# Patient Record
Sex: FEMALE | Race: WHITE | NOT HISPANIC OR LATINO | Employment: FULL TIME | URBAN - METROPOLITAN AREA
[De-identification: names, ages, dates, MRNs, and addresses within clinical notes are randomized per-mention and may not be internally consistent; named-entity substitution may affect disease eponyms.]

---

## 2018-11-22 ENCOUNTER — HOSPITAL ENCOUNTER (EMERGENCY)
Facility: HOSPITAL | Age: 58
Discharge: HOME | End: 2018-11-22
Attending: EMERGENCY MEDICINE
Payer: COMMERCIAL

## 2018-11-22 VITALS
OXYGEN SATURATION: 95 % | RESPIRATION RATE: 16 BRPM | BODY MASS INDEX: 24.71 KG/M2 | SYSTOLIC BLOOD PRESSURE: 120 MMHG | HEART RATE: 79 BPM | DIASTOLIC BLOOD PRESSURE: 60 MMHG | HEIGHT: 68 IN | WEIGHT: 163 LBS | TEMPERATURE: 98 F

## 2018-11-22 DIAGNOSIS — R07.81 RIB PAIN ON RIGHT SIDE: ICD-10-CM

## 2018-11-22 DIAGNOSIS — S22.31XA CLOSED FRACTURE OF ONE RIB OF RIGHT SIDE, INITIAL ENCOUNTER: Primary | ICD-10-CM

## 2018-11-22 DIAGNOSIS — R55 SYNCOPE, UNSPECIFIED SYNCOPE TYPE: ICD-10-CM

## 2018-11-22 DIAGNOSIS — R55 SYNCOPE: ICD-10-CM

## 2018-11-22 LAB
ALBUMIN SERPL BCP-MCNC: 4.1 G/DL
ALP SERPL-CCNC: 102 U/L
ALT SERPL W/O P-5'-P-CCNC: 53 U/L
ANION GAP SERPL CALC-SCNC: 12 MMOL/L
AST SERPL-CCNC: 53 U/L
BASOPHILS # BLD AUTO: 0 K/UL
BASOPHILS NFR BLD: 0.3 %
BILIRUB SERPL-MCNC: 0.3 MG/DL
BUN SERPL-MCNC: 21 MG/DL
CALCIUM SERPL-MCNC: 9.4 MG/DL
CHLORIDE SERPL-SCNC: 100 MMOL/L
CO2 SERPL-SCNC: 27 MMOL/L
CREAT SERPL-MCNC: 0.9 MG/DL
DIFFERENTIAL METHOD: ABNORMAL
EOSINOPHIL # BLD AUTO: 0 K/UL
EOSINOPHIL NFR BLD: 0.3 %
ERYTHROCYTE [DISTWIDTH] IN BLOOD BY AUTOMATED COUNT: 13.7 %
EST. GFR  (AFRICAN AMERICAN): >60 ML/MIN/1.73 M^2
EST. GFR  (NON AFRICAN AMERICAN): >60 ML/MIN/1.73 M^2
GLUCOSE SERPL-MCNC: 137 MG/DL
HCT VFR BLD AUTO: 39.7 %
HGB BLD-MCNC: 13.4 G/DL
LYMPHOCYTES # BLD AUTO: 1.1 K/UL
LYMPHOCYTES NFR BLD: 12.9 %
MAGNESIUM SERPL-MCNC: 2.4 MG/DL
MCH RBC QN AUTO: 28.8 PG
MCHC RBC AUTO-ENTMCNC: 33.7 G/DL
MCV RBC AUTO: 86 FL
MONOCYTES # BLD AUTO: 0.4 K/UL
MONOCYTES NFR BLD: 5.1 %
NEUTROPHILS # BLD AUTO: 7.1 K/UL
NEUTROPHILS NFR BLD: 81.4 %
PLATELET # BLD AUTO: 322 K/UL
PMV BLD AUTO: 7.6 FL
POTASSIUM SERPL-SCNC: 3.2 MMOL/L
PROT SERPL-MCNC: 7.4 G/DL
RBC # BLD AUTO: 4.63 M/UL
SODIUM SERPL-SCNC: 139 MMOL/L
WBC # BLD AUTO: 8.7 K/UL

## 2018-11-22 PROCEDURE — 80053 COMPREHEN METABOLIC PANEL: CPT

## 2018-11-22 PROCEDURE — 85025 COMPLETE CBC W/AUTO DIFF WBC: CPT

## 2018-11-22 PROCEDURE — 25000003 PHARM REV CODE 250: Performed by: EMERGENCY MEDICINE

## 2018-11-22 PROCEDURE — 94799 UNLISTED PULMONARY SVC/PX: CPT

## 2018-11-22 PROCEDURE — 93005 ELECTROCARDIOGRAM TRACING: CPT

## 2018-11-22 PROCEDURE — 99284 EMERGENCY DEPT VISIT MOD MDM: CPT | Mod: 25

## 2018-11-22 PROCEDURE — 83735 ASSAY OF MAGNESIUM: CPT

## 2018-11-22 PROCEDURE — 36415 COLL VENOUS BLD VENIPUNCTURE: CPT

## 2018-11-22 RX ORDER — TRAZODONE HYDROCHLORIDE 50 MG/1
50 TABLET ORAL NIGHTLY
COMMUNITY
End: 2020-12-07

## 2018-11-22 RX ORDER — HYDROCODONE BITARTRATE AND ACETAMINOPHEN 5; 325 MG/1; MG/1
1 TABLET ORAL EVERY 6 HOURS PRN
Qty: 15 TABLET | Refills: 0 | Status: SHIPPED | OUTPATIENT
Start: 2018-11-22 | End: 2020-12-07

## 2018-11-22 RX ORDER — CITALOPRAM 10 MG/1
10 TABLET ORAL DAILY
COMMUNITY
End: 2021-05-24

## 2018-11-22 RX ORDER — HYDROCODONE BITARTRATE AND ACETAMINOPHEN 5; 325 MG/1; MG/1
1 TABLET ORAL
Status: COMPLETED | OUTPATIENT
Start: 2018-11-22 | End: 2018-11-22

## 2018-11-22 RX ADMIN — HYDROCODONE BITARTRATE AND ACETAMINOPHEN 1 TABLET: 5; 325 TABLET ORAL at 04:11

## 2018-11-22 NOTE — ED PROVIDER NOTES
"Encounter Date: 11/22/2018    SCRIBE #1 NOTE: I, Mai Ferreira, am scribing for, and in the presence of, Dr. Otis Jacome.       History     Chief Complaint   Patient presents with    Loss of Consciousness     " passed out and fell "     Fall     rt. rib pain        Time seen by provider: 2:13 PM on 11/22/2018    Leticia Zelaya is a 58 y.o. female who presents to the ED with an onset of of right-sided rib pain that resulted after becoming dizzy and passing out one day ago. When she passed out, she landed on the right side of her body. After passing out, she had another episode of dizziness. Although, she no longer feels dizzy. The pt explains that this happens "periodically". After previous episodes, she had blood work done at other EDs, which revealed that she had low potassium both times. The patient denies any other symptoms at this time. No pertinent SHx noted. Pt is allergic to Percocet.       The history is provided by the patient.     Review of patient's allergies indicates:   Allergen Reactions    Percocet [oxycodone-acetaminophen] Itching     History reviewed. No pertinent past medical history.  History reviewed. No pertinent surgical history.  Family History   Problem Relation Age of Onset    Hypertension Father     Heart disease Neg Hx     Heart attack Neg Hx      Social History     Tobacco Use    Smoking status: Never Smoker   Substance Use Topics    Alcohol use: Yes     Alcohol/week: 1.8 oz     Types: 3 Glasses of wine per week    Drug use: Not on file     Review of Systems   Constitutional: Negative for fever.   HENT: Negative for sore throat.    Respiratory: Negative for shortness of breath.    Cardiovascular: Negative for chest pain.   Gastrointestinal: Negative for nausea.   Genitourinary: Negative for dysuria.   Musculoskeletal: Negative for back pain.        Positive for right-sided rib pain.   Skin: Negative for rash.   Neurological: Positive for dizziness (Resolved) and " syncope (Resolved). Negative for weakness.   Hematological: Does not bruise/bleed easily.       Physical Exam     Initial Vitals   BP Pulse Resp Temp SpO2   11/22/18 1406 11/22/18 1406 11/22/18 1406 11/22/18 1406 11/22/18 1443   131/67 76 16 97.8 °F (36.6 °C) (!) 93 %      MAP       --                Physical Exam    Nursing note and vitals reviewed.  Constitutional: She appears well-developed and well-nourished. She is not diaphoretic. No distress.   HENT:   Head: Normocephalic and atraumatic.   Eyes: EOM are normal. Pupils are equal, round, and reactive to light.   Neck: Normal range of motion. Neck supple.   Cardiovascular: Normal rate, regular rhythm, normal heart sounds and intact distal pulses. Exam reveals no gallop and no friction rub.    No murmur heard.  Pulmonary/Chest: Breath sounds normal. No respiratory distress. She has no wheezes. She has no rhonchi. She has no rales. She exhibits bony tenderness.   Mild right rib tenderness.    Abdominal: Soft. Bowel sounds are normal. There is no tenderness. There is no rebound and no guarding.   Musculoskeletal: Normal range of motion.   Neurological: She is alert and oriented to person, place, and time. She has normal strength. No cranial nerve deficit or sensory deficit. GCS eye subscore is 4. GCS verbal subscore is 5. GCS motor subscore is 6.   No focal neurological deficits noted.  Cranial nerves III-XII grossly intact.  Equal, rapid alternating movements noted to bilateral upper and lower extremities.    Skin: Skin is warm and dry.   Psychiatric: She has a normal mood and affect. Her behavior is normal. Judgment and thought content normal.         ED Course   Procedures  Labs Reviewed   COMPREHENSIVE METABOLIC PANEL - Abnormal; Notable for the following components:       Result Value    Potassium 3.2 (*)     Glucose 137 (*)     BUN, Bld 21 (*)     AST 53 (*)     ALT 53 (*)     All other components within normal limits   CBC W/ AUTO DIFFERENTIAL - Abnormal;  Notable for the following components:    MPV 7.6 (*)     Gran% 81.4 (*)     Lymph% 12.9 (*)     All other components within normal limits   MAGNESIUM     EKG Readings: (Independently Interpreted)   Initial Reading: No STEMI. Rhythm: Normal Sinus Rhythm. Heart Rate: 76 bpm. Axis: Normal.   Prolonged QT. No ST segment changes.        Imaging Results          CT Head Without Contrast (Final result)  Result time 11/22/18 14:45:51    Final result by Rivera Ibarra MD (11/22/18 14:45:51)                 Impression:      No acute intracranial abnormality.      Electronically signed by: Rivera Ibarra  Date:    11/22/2018  Time:    14:45             Narrative:    EXAMINATION:  CT HEAD WITHOUT CONTRAST    CLINICAL HISTORY:  Dizziness;    TECHNIQUE:  Low dose axial images were obtained through the head.  Coronal and sagittal reformations were also performed. Contrast was not administered.    COMPARISON:  None.    FINDINGS:  There are involutional changes with normal size of the ventricular system.    No mass, hemorrhage or acute major vascular distribution infarct. No mass effect or midline shift. Included orbits are unremarkable. Paranasal sinuses and mastoid air cells are clear.  No acute osseous abnormality.                               X-Ray Chest 1 View (Final result)  Result time 11/22/18 14:40:54    Final result by Rivera Ibarra MD (11/22/18 14:40:54)                 Impression:      Mildly displaced right rib fracture      Electronically signed by: Rivera Ibarra  Date:    11/22/2018  Time:    14:40             Narrative:    EXAMINATION:  XR RIBS 2 VIEW RIGHT; XR CHEST 1 VIEW    CLINICAL HISTORY:  Pleurodynia    TECHNIQUE:  Two views of the right ribs were performed.  Frontal view of the chest.    COMPARISON:  None    FINDINGS:  Elevated right hemidiaphragm.  Lungs are clear.  Normal size heart.  No pneumothorax or large pleural effusion.  Cholecystectomy.  Mild multilevel degenerative changes in the  spine.  Right lateral 5th rib fracture with less than 1/2 shaft width displacement.                               X-Ray Ribs 2 View Right (Final result)  Result time 11/22/18 14:40:54    Final result by Rivera Ibarra MD (11/22/18 14:40:54)                 Impression:      Mildly displaced right rib fracture      Electronically signed by: Rivera Ibarra  Date:    11/22/2018  Time:    14:40             Narrative:    EXAMINATION:  XR RIBS 2 VIEW RIGHT; XR CHEST 1 VIEW    CLINICAL HISTORY:  Pleurodynia    TECHNIQUE:  Two views of the right ribs were performed.  Frontal view of the chest.    COMPARISON:  None    FINDINGS:  Elevated right hemidiaphragm.  Lungs are clear.  Normal size heart.  No pneumothorax or large pleural effusion.  Cholecystectomy.  Mild multilevel degenerative changes in the spine.  Right lateral 5th rib fracture with less than 1/2 shaft width displacement.                                 Medical Decision Making:   History:   Old Medical Records: I decided to obtain old medical records.  Initial Assessment:    58-year-old female presents after a syncopal episode.  Differential Diagnosis:   My differential diagnosis includes dehydration, vasovagal syncope, cardiac arrhythmia, and rib fracture.   Clinical Tests:   Lab Tests: Ordered and Reviewed  Radiological Study: Ordered and Reviewed  Medical Tests: Ordered and Reviewed  ED Management:    The patient was emergently evaluated in the emergency department, her evaluation was significant for an elderly female with a normal neurologic exam and with right rib tenderness.  The patient does have had a rib fracture noted on x-ray.  The patient's CT scan of her head showed no acute abnormalities.  The patient's EKG shows a prolonged QT per my independent interpretation, although I am not so sure that this is the etiology of her syncopal episodes.  The patient's labs were significant for mild hypokalemia.  The patient is stable for discharge to home.     She is instructed to follow up with her cardiologist as an outpatient for further workup of her syncopal episodes additionally she will be discharged home with p.o. Pain medication and an incentive spirometer for her rib fracture.  She can follow this fracture up with her primary care physician.            Morgan Attestation:   Scribe #1: I performed the above scribed service and the documentation accurately describes the services I performed. I attest to the accuracy of the note.        I, Dr. Otis Jacome, personally performed the services described in this documentation. All medical record entries made by the scribe were at my direction and in my presence.  I have reviewed the chart and agree that the record reflects my personal performance and is accurate and complete. Otis Jacome MD.  6:18 PM 11/22/2018          Clinical Impression:   The primary encounter diagnosis was Closed fracture of one rib of right side, initial encounter. Diagnoses of Syncope, Rib pain on right side, and Syncope, unspecified syncope type were also pertinent to this visit.      Disposition:   Disposition: Discharged  Condition: Stable                        Otis Jacome MD  11/22/18 1820       Otis Jacome MD  11/22/18 7921

## 2020-11-25 ENCOUNTER — OFFICE VISIT (OUTPATIENT)
Dept: FAMILY MEDICINE | Facility: CLINIC | Age: 60
End: 2020-11-25
Payer: COMMERCIAL

## 2020-11-25 ENCOUNTER — LAB VISIT (OUTPATIENT)
Dept: LAB | Facility: HOSPITAL | Age: 60
End: 2020-11-25
Attending: STUDENT IN AN ORGANIZED HEALTH CARE EDUCATION/TRAINING PROGRAM
Payer: COMMERCIAL

## 2020-11-25 VITALS
DIASTOLIC BLOOD PRESSURE: 70 MMHG | WEIGHT: 204.13 LBS | OXYGEN SATURATION: 96 % | RESPIRATION RATE: 18 BRPM | SYSTOLIC BLOOD PRESSURE: 128 MMHG | HEART RATE: 60 BPM | HEIGHT: 68 IN | BODY MASS INDEX: 30.94 KG/M2 | TEMPERATURE: 98 F

## 2020-11-25 DIAGNOSIS — Z00.00 ROUTINE GENERAL MEDICAL EXAMINATION AT A HEALTH CARE FACILITY: Primary | ICD-10-CM

## 2020-11-25 DIAGNOSIS — R07.81 RIB PAIN: ICD-10-CM

## 2020-11-25 DIAGNOSIS — Z00.00 ROUTINE GENERAL MEDICAL EXAMINATION AT A HEALTH CARE FACILITY: ICD-10-CM

## 2020-11-25 DIAGNOSIS — L72.3 SEBACEOUS CYST: ICD-10-CM

## 2020-11-25 DIAGNOSIS — Z23 NEED FOR INFLUENZA VACCINATION: ICD-10-CM

## 2020-11-25 DIAGNOSIS — Z12.31 ENCOUNTER FOR SCREENING MAMMOGRAM FOR BREAST CANCER: ICD-10-CM

## 2020-11-25 LAB
ALBUMIN SERPL BCP-MCNC: 3.9 G/DL (ref 3.5–5.2)
ALP SERPL-CCNC: 72 U/L (ref 55–135)
ALT SERPL W/O P-5'-P-CCNC: 21 U/L (ref 10–44)
ANION GAP SERPL CALC-SCNC: 10 MMOL/L (ref 8–16)
AST SERPL-CCNC: 27 U/L (ref 10–40)
BASOPHILS # BLD AUTO: 0.07 K/UL (ref 0–0.2)
BASOPHILS NFR BLD: 1.1 % (ref 0–1.9)
BILIRUB SERPL-MCNC: 0.3 MG/DL (ref 0.1–1)
BUN SERPL-MCNC: 14 MG/DL (ref 8–23)
CALCIUM SERPL-MCNC: 9 MG/DL (ref 8.7–10.5)
CHLORIDE SERPL-SCNC: 105 MMOL/L (ref 95–110)
CHOLEST SERPL-MCNC: 218 MG/DL (ref 120–199)
CHOLEST/HDLC SERPL: 2.7 {RATIO} (ref 2–5)
CO2 SERPL-SCNC: 26 MMOL/L (ref 23–29)
CREAT SERPL-MCNC: 0.8 MG/DL (ref 0.5–1.4)
DIFFERENTIAL METHOD: ABNORMAL
EOSINOPHIL # BLD AUTO: 0.1 K/UL (ref 0–0.5)
EOSINOPHIL NFR BLD: 0.9 % (ref 0–8)
ERYTHROCYTE [DISTWIDTH] IN BLOOD BY AUTOMATED COUNT: 14.1 % (ref 11.5–14.5)
EST. GFR  (AFRICAN AMERICAN): >60 ML/MIN/1.73 M^2
EST. GFR  (NON AFRICAN AMERICAN): >60 ML/MIN/1.73 M^2
ESTIMATED AVG GLUCOSE: 111 MG/DL (ref 68–131)
GLUCOSE SERPL-MCNC: 81 MG/DL (ref 70–110)
HBA1C MFR BLD HPLC: 5.5 % (ref 4–5.6)
HCT VFR BLD AUTO: 42.3 % (ref 37–48.5)
HDLC SERPL-MCNC: 81 MG/DL (ref 40–75)
HDLC SERPL: 37.2 % (ref 20–50)
HGB BLD-MCNC: 13.2 G/DL (ref 12–16)
IMM GRANULOCYTES # BLD AUTO: 0.02 K/UL (ref 0–0.04)
IMM GRANULOCYTES NFR BLD AUTO: 0.3 % (ref 0–0.5)
LDLC SERPL CALC-MCNC: 118.6 MG/DL (ref 63–159)
LYMPHOCYTES # BLD AUTO: 1.7 K/UL (ref 1–4.8)
LYMPHOCYTES NFR BLD: 25.2 % (ref 18–48)
MCH RBC QN AUTO: 28.3 PG (ref 27–31)
MCHC RBC AUTO-ENTMCNC: 31.2 G/DL (ref 32–36)
MCV RBC AUTO: 91 FL (ref 82–98)
MONOCYTES # BLD AUTO: 0.5 K/UL (ref 0.3–1)
MONOCYTES NFR BLD: 6.8 % (ref 4–15)
NEUTROPHILS # BLD AUTO: 4.4 K/UL (ref 1.8–7.7)
NEUTROPHILS NFR BLD: 65.7 % (ref 38–73)
NONHDLC SERPL-MCNC: 137 MG/DL
NRBC BLD-RTO: 0 /100 WBC
PLATELET # BLD AUTO: 295 K/UL (ref 150–350)
PMV BLD AUTO: 10.6 FL (ref 9.2–12.9)
POTASSIUM SERPL-SCNC: 4 MMOL/L (ref 3.5–5.1)
PROT SERPL-MCNC: 7.2 G/DL (ref 6–8.4)
RBC # BLD AUTO: 4.66 M/UL (ref 4–5.4)
SODIUM SERPL-SCNC: 141 MMOL/L (ref 136–145)
TRIGL SERPL-MCNC: 92 MG/DL (ref 30–150)
TSH SERPL DL<=0.005 MIU/L-ACNC: 0.85 UIU/ML (ref 0.4–4)
WBC # BLD AUTO: 6.62 K/UL (ref 3.9–12.7)

## 2020-11-25 PROCEDURE — 99204 OFFICE O/P NEW MOD 45 MIN: CPT | Mod: 25,S$GLB,, | Performed by: STUDENT IN AN ORGANIZED HEALTH CARE EDUCATION/TRAINING PROGRAM

## 2020-11-25 PROCEDURE — 3008F BODY MASS INDEX DOCD: CPT | Mod: CPTII,S$GLB,, | Performed by: STUDENT IN AN ORGANIZED HEALTH CARE EDUCATION/TRAINING PROGRAM

## 2020-11-25 PROCEDURE — 3078F DIAST BP <80 MM HG: CPT | Mod: CPTII,S$GLB,, | Performed by: STUDENT IN AN ORGANIZED HEALTH CARE EDUCATION/TRAINING PROGRAM

## 2020-11-25 PROCEDURE — 1125F PR PAIN SEVERITY QUANTIFIED, PAIN PRESENT: ICD-10-PCS | Mod: S$GLB,,, | Performed by: STUDENT IN AN ORGANIZED HEALTH CARE EDUCATION/TRAINING PROGRAM

## 2020-11-25 PROCEDURE — 85025 COMPLETE CBC W/AUTO DIFF WBC: CPT

## 2020-11-25 PROCEDURE — 36415 COLL VENOUS BLD VENIPUNCTURE: CPT | Mod: PO

## 2020-11-25 PROCEDURE — 3078F PR MOST RECENT DIASTOLIC BLOOD PRESSURE < 80 MM HG: ICD-10-PCS | Mod: CPTII,S$GLB,, | Performed by: STUDENT IN AN ORGANIZED HEALTH CARE EDUCATION/TRAINING PROGRAM

## 2020-11-25 PROCEDURE — 90471 IMMUNIZATION ADMIN: CPT | Mod: S$GLB,,, | Performed by: STUDENT IN AN ORGANIZED HEALTH CARE EDUCATION/TRAINING PROGRAM

## 2020-11-25 PROCEDURE — 90686 IIV4 VACC NO PRSV 0.5 ML IM: CPT | Mod: S$GLB,,, | Performed by: STUDENT IN AN ORGANIZED HEALTH CARE EDUCATION/TRAINING PROGRAM

## 2020-11-25 PROCEDURE — 83036 HEMOGLOBIN GLYCOSYLATED A1C: CPT

## 2020-11-25 PROCEDURE — 99999 PR PBB SHADOW E&M-EST. PATIENT-LVL V: ICD-10-PCS | Mod: PBBFAC,,, | Performed by: STUDENT IN AN ORGANIZED HEALTH CARE EDUCATION/TRAINING PROGRAM

## 2020-11-25 PROCEDURE — 3074F SYST BP LT 130 MM HG: CPT | Mod: CPTII,S$GLB,, | Performed by: STUDENT IN AN ORGANIZED HEALTH CARE EDUCATION/TRAINING PROGRAM

## 2020-11-25 PROCEDURE — 3008F PR BODY MASS INDEX (BMI) DOCUMENTED: ICD-10-PCS | Mod: CPTII,S$GLB,, | Performed by: STUDENT IN AN ORGANIZED HEALTH CARE EDUCATION/TRAINING PROGRAM

## 2020-11-25 PROCEDURE — 3074F PR MOST RECENT SYSTOLIC BLOOD PRESSURE < 130 MM HG: ICD-10-PCS | Mod: CPTII,S$GLB,, | Performed by: STUDENT IN AN ORGANIZED HEALTH CARE EDUCATION/TRAINING PROGRAM

## 2020-11-25 PROCEDURE — 90686 FLU VACCINE (QUAD) GREATER THAN OR EQUAL TO 3YO PRESERVATIVE FREE IM: ICD-10-PCS | Mod: S$GLB,,, | Performed by: STUDENT IN AN ORGANIZED HEALTH CARE EDUCATION/TRAINING PROGRAM

## 2020-11-25 PROCEDURE — 99204 PR OFFICE/OUTPT VISIT, NEW, LEVL IV, 45-59 MIN: ICD-10-PCS | Mod: 25,S$GLB,, | Performed by: STUDENT IN AN ORGANIZED HEALTH CARE EDUCATION/TRAINING PROGRAM

## 2020-11-25 PROCEDURE — 84443 ASSAY THYROID STIM HORMONE: CPT

## 2020-11-25 PROCEDURE — 99999 PR PBB SHADOW E&M-EST. PATIENT-LVL V: CPT | Mod: PBBFAC,,, | Performed by: STUDENT IN AN ORGANIZED HEALTH CARE EDUCATION/TRAINING PROGRAM

## 2020-11-25 PROCEDURE — 80061 LIPID PANEL: CPT

## 2020-11-25 PROCEDURE — 90471 FLU VACCINE (QUAD) GREATER THAN OR EQUAL TO 3YO PRESERVATIVE FREE IM: ICD-10-PCS | Mod: S$GLB,,, | Performed by: STUDENT IN AN ORGANIZED HEALTH CARE EDUCATION/TRAINING PROGRAM

## 2020-11-25 PROCEDURE — 1125F AMNT PAIN NOTED PAIN PRSNT: CPT | Mod: S$GLB,,, | Performed by: STUDENT IN AN ORGANIZED HEALTH CARE EDUCATION/TRAINING PROGRAM

## 2020-11-25 PROCEDURE — 80053 COMPREHEN METABOLIC PANEL: CPT

## 2020-11-25 RX ORDER — RAMELTEON 8 MG/1
TABLET ORAL
COMMUNITY
Start: 2020-11-06 | End: 2022-12-29

## 2020-11-25 RX ORDER — CYCLOBENZAPRINE HCL 5 MG
5 TABLET ORAL 3 TIMES DAILY PRN
Qty: 30 TABLET | Refills: 1 | Status: SHIPPED | OUTPATIENT
Start: 2020-11-25 | End: 2020-12-28

## 2020-11-25 RX ORDER — SODIUM, POTASSIUM,MAG SULFATES 17.5-3.13G
SOLUTION, RECONSTITUTED, ORAL ORAL
COMMUNITY
Start: 2020-08-28 | End: 2021-05-24

## 2020-11-25 RX ORDER — PRAZOSIN HYDROCHLORIDE 1 MG/1
CAPSULE ORAL
COMMUNITY
Start: 2015-01-02 | End: 2021-05-24

## 2020-11-25 RX ORDER — ALPRAZOLAM 0.25 MG/1
TABLET ORAL
COMMUNITY
Start: 2020-11-17

## 2020-11-25 RX ORDER — RIZATRIPTAN BENZOATE 10 MG/1
TABLET, ORALLY DISINTEGRATING ORAL
COMMUNITY
Start: 2020-10-15

## 2020-11-25 RX ORDER — RIMEGEPANT SULFATE 75 MG/75MG
TABLET, ORALLY DISINTEGRATING ORAL
COMMUNITY
Start: 2020-11-06

## 2020-11-25 RX ORDER — CITALOPRAM 40 MG/1
TABLET, FILM COATED ORAL
COMMUNITY
Start: 2020-09-28 | End: 2021-09-23

## 2020-11-25 RX ORDER — PRAZOSIN HYDROCHLORIDE 1 MG/1
CAPSULE ORAL
COMMUNITY
Start: 2020-11-03 | End: 2021-05-24

## 2020-11-25 RX ORDER — OMEGA-3-ACID ETHYL ESTERS 1 G/1
CAPSULE, LIQUID FILLED ORAL
COMMUNITY
Start: 2020-11-17

## 2020-11-25 NOTE — PATIENT INSTRUCTIONS
"Shingrix- 2 doses, check at pharmacy        I recommend a heart healthy diet rich in fiber, fresh vegetables and fruit and low in saturated fats (fried foods, red meat, etc.).  The Mediterranean diet is probably the best for your health with a plant based diet high in nuts, beans, olive oil and a few servings a week of fish. You do not need to eat a meat at every meal to be healthy!    I also recommend regular exercise. There are targets set by the national cardiology society including a minimum of 150 minutes of cardio exercise per week. Strength exercises are also important.  2-30 minute workouts of strength training like light weights, pilates, etc.  You should work toward a body mass index of < 25.    If you are not doing any exercise right now, don't start out with these high amounts. Start slow and avoid soreness. The goal is consistency not overdoing it once. Try some body squats, pushups, brisk walking and work your way up. I am a fan of "micro workouts" spread throughout the day. These consist of doing just a few minutes of exercise periodically throughout the day when you have time. An example would be doing some half standing pushups on a stable counter or other service during a break at work.     Flexibility and balance training are often overlooked. I recommended stretching at least 3-4 times/week to prevent injuries, especially in your back. I also recommend training your balance. Yoga often has balance training included, but you can do it without a formal program. While on a soft surface with something you can sit back on if you fall like a couch, try standing on 1 leg with your eyes fixed on 1 point on the floor. Switch legs and repeat. Try doing this at least several times a week.         "

## 2020-11-25 NOTE — PROGRESS NOTES
[facility] NOTE    Patient Name: Leticia Zelaya  YOB: 1960    PRESENTING HISTORY   Chief Complaint:   Chief Complaint   Patient presents with    Establish Care      New patient to me  History of Present Illness:  Ms. Leticia Zelaya is a 60 y.o. female here to establish care.     Interested in HCM:  Flu shot  Mammogram  DEXA  Has questions about Shingles vaccination    Lump on back  Present for a long time, gradually worsening  Becoming uncomfortable.   No redness or fevers.     Pain right rib.   Continuous for 2 years since rib fracture.   Worse with twisting movements  Worse at night  Worse with inspiration.     C-scope 1 month ago. Polyps      Due for 3 year f/u per her    Pap history:   Karyna [facility], [place] near [facility].   Hasn't had in a while. Never had abnormal  Was told she didn't need one at previous OBGYN appointments.     Dr Mayer is her psychiatrist. Anxiety and depression is under good control. ?PTSD.   This was not explored in detail.     Review of Systems   Constitutional: Negative for chills, fever and weight loss.   HENT: Negative for hearing loss.    Eyes: Negative for discharge.   Respiratory: Negative for wheezing.    Cardiovascular: Negative for chest pain and palpitations.   Gastrointestinal: Positive for constipation. Negative for blood in stool, diarrhea and vomiting.   Genitourinary: Negative for dysuria and hematuria.   Musculoskeletal: Positive for neck pain.   Skin: Positive for rash. Negative for itching.   Neurological: Positive for headaches. Negative for weakness.   Endo/Heme/Allergies: Negative for polydipsia.         PAST HISTORY:     Past Medical History:   Diagnosis Date    Anxiety     Depression        Past Surgical History:   Procedure Laterality Date    APPENDECTOMY      BREAST FIBROADENOMA SURGERY Bilateral 1990's    3     CHOLECYSTECTOMY      TONSILLECTOMY         Family History   Problem Relation Age of Onset    Hypertension  Father     Heart disease Neg Hx     Heart attack Neg Hx        Social History     Socioeconomic History    Marital status:      Spouse name: Not on file    Number of children: Not on file    Years of education: Not on file    Highest education level: Not on file   Occupational History    Not on file   Social Needs    Financial resource strain: Not hard at all    Food insecurity     Worry: Never true     Inability: Never true    Transportation needs     Medical: No     Non-medical: No   Tobacco Use    Smoking status: Never Smoker   Substance and Sexual Activity    Alcohol use: Yes     Alcohol/week: 3.0 standard drinks     Types: 3 Glasses of wine per week     Frequency: 2-3 times a week     Drinks per session: 1 or 2     Binge frequency: Never    Drug use: Not on file    Sexual activity: Not on file   Lifestyle    Physical activity     Days per week: 0 days     Minutes per session: Not on file    Stress: Very much   Relationships    Social connections     Talks on phone: Twice a week     Gets together: Twice a week     Attends [employer]ism service: Not on file     Active member of club or organization: No     Attends meetings of clubs or organizations: Never     Relationship status:    Other Topics Concern    Not on file   Social History Narrative    Not on file       MEDICATIONS & ALLERGIES:     Current Outpatient Medications on File Prior to Visit   Medication Sig    ALPRAZolam (XANAX) 0.25 MG tablet TK 1/2 TO 1 T PO UP TO BID PRN    citalopram (CELEXA) 40 MG tablet TK 1 T PO QD    NURTEC 75 mg odt PLACE 1 T ON THE TONGUE AND ALLOW TO DISSOLVE ONCE D PRN    omega-3 acid ethyl esters (LOVAZA) 1 gram capsule TK 2 CS PO QD    prazosin (MINIPRESS) 1 MG Cap     prazosin (MINIPRESS) 1 MG Cap TK 1 TO 2 CS PO Q NIGHT PRN    ramelteon (ROZEREM) 8 mg tablet TK 1 T PO QHS PRN    rizatriptan (MAXALT-MLT) 10 MG disintegrating tablet PLACE 1 T ON THE TONGUE AND ALLOW TO DISSOLVE ONE TIME MAY  "REPEAT ONCE IN  2 H X2. MAX DAILY DOSE 30MG/24H    SUPREP BOWEL PREP KIT 17.5-3.13-1.6 gram SolR MX AND DRK UTD    triamterene-hydrochlorothiazide 37.5-25 mg (MAXZIDE-25) 37.5-25 mg per tablet Take 1 tablet by mouth once daily.     ZINC PICOLINATE ORAL Take by mouth. Take 1 tab each morning.    alprazolam (XANAX) 1 MG tablet 2 (two) times daily as needed.     citalopram (CELEXA) 10 MG tablet Take 10 mg by mouth once daily.    HYDROcodone-acetaminophen (NORCO) 5-325 mg per tablet Take 1 tablet by mouth every 6 (six) hours as needed for Pain.    traZODone (DESYREL) 50 MG tablet Take 50 mg by mouth every evening.     No current facility-administered medications on file prior to visit.        Review of patient's allergies indicates:   Allergen Reactions    Fish containing products Hives, Itching, Rash and Swelling    Percocet [oxycodone-acetaminophen] Itching       OBJECTIVE:   Vital Signs:  Vitals:    11/25/20 0854   BP: 128/70   Pulse: 60   Resp: 18   Temp: 97.7 °F (36.5 °C)   TempSrc: Temporal   SpO2: 96%   Weight: 92.6 kg (204 lb 2.3 oz)   Height: 5' 8" (1.727 m)       No results found for this or any previous visit (from the past 24 hour(s)).      Physical Exam   Constitutional: She is oriented to person, place, and time and well-developed, well-nourished, and in no distress.   HENT:   Head: Normocephalic and atraumatic.   Right Ear: External ear normal.   Left Ear: External ear normal.   Eyes: Pupils are equal, round, and reactive to light. Conjunctivae and EOM are normal. No scleral icterus.   Neck: Normal range of motion. Neck supple. No thyromegaly present.   Cardiovascular: Normal rate, regular rhythm and normal heart sounds.   No murmur heard.  Pulmonary/Chest: Effort normal and breath sounds normal. No respiratory distress. She has no wheezes. She has no rales.   No rib tenderness. No rash.    Musculoskeletal: Normal range of motion.         General: No tenderness, deformity or edema. "   Lymphadenopathy:     She has no cervical adenopathy.   Neurological: She is alert and oriented to person, place, and time. Gait normal. GCS score is 15.   Skin: Skin is warm and dry. No rash noted. She is not diaphoretic. No erythema.   Approx 3 cm cystic mass to left upper back with small opening. No erythema or discharge. No tenderness.    Psychiatric: Mood, memory, affect and judgment normal.   Nursing note and vitals reviewed.      ASSESSMENT & PLAN:      60 F here to establish care.     Well controlled mood disorder, follows with psychiatry.     Routine screening labs.   Routine general medical examination at a health care facility  -     Lipid Panel; Future; Expected date: 11/25/2020  -     Hemoglobin A1C; Future; Expected date: 11/25/2020  -     Comprehensive Metabolic Panel; Future; Expected date: 11/25/2020  -     TSH; Future; Expected date: 11/25/2020  -     DXA Bone Density Spine And Hip; Future; Expected date: 11/25/2020  -     CBC Auto Differential; Future; Expected date: 11/25/2020    Encounter for screening mammogram for breast cancer  -     Mammo Digital Screening Bilat; Future; Expected date: 11/25/2020    Sebaceous cyst  -     Ambulatory referral/consult to General Surgery; Future; Expected date: 12/02/2020    Rib pain  -     X-Ray Chest PA And Lateral; Future; Expected date: 11/25/2020  -     cyclobenzaprine (FLEXERIL) 5 MG tablet; Take 1 tablet (5 mg total) by mouth 3 (three) times daily as needed for Muscle spasms.  Dispense: 30 tablet; Refill: 1  Seems MSK-related. Unclear why she developed chronic rib pain.   Check to make sure bones healed appopriately.   Short course of muscle relaxants.     Josh Aj MD           Answers for HPI/ROS submitted by the patient on 11/23/2020   activity change: Yes  unexpected weight change: No  rhinorrhea: No  trouble swallowing: No  visual disturbance: No  chest tightness: Yes  polyuria: No  difficulty urinating: No  menstrual problem: No  joint  swelling: Yes  arthralgias: Yes  confusion: No  dysphoric mood: No

## 2020-12-07 ENCOUNTER — OFFICE VISIT (OUTPATIENT)
Dept: SURGERY | Facility: CLINIC | Age: 60
End: 2020-12-07
Payer: COMMERCIAL

## 2020-12-07 VITALS
BODY MASS INDEX: 29.6 KG/M2 | WEIGHT: 195.31 LBS | SYSTOLIC BLOOD PRESSURE: 126 MMHG | RESPIRATION RATE: 16 BRPM | HEART RATE: 82 BPM | HEIGHT: 68 IN | TEMPERATURE: 97 F | DIASTOLIC BLOOD PRESSURE: 61 MMHG

## 2020-12-07 DIAGNOSIS — L72.3 SEBACEOUS CYST: ICD-10-CM

## 2020-12-07 PROCEDURE — 3008F BODY MASS INDEX DOCD: CPT | Mod: CPTII,S$GLB,, | Performed by: STUDENT IN AN ORGANIZED HEALTH CARE EDUCATION/TRAINING PROGRAM

## 2020-12-07 PROCEDURE — 3074F SYST BP LT 130 MM HG: CPT | Mod: CPTII,S$GLB,, | Performed by: STUDENT IN AN ORGANIZED HEALTH CARE EDUCATION/TRAINING PROGRAM

## 2020-12-07 PROCEDURE — 1125F PR PAIN SEVERITY QUANTIFIED, PAIN PRESENT: ICD-10-PCS | Mod: S$GLB,,, | Performed by: STUDENT IN AN ORGANIZED HEALTH CARE EDUCATION/TRAINING PROGRAM

## 2020-12-07 PROCEDURE — 99999 PR PBB SHADOW E&M-EST. PATIENT-LVL IV: ICD-10-PCS | Mod: PBBFAC,,, | Performed by: STUDENT IN AN ORGANIZED HEALTH CARE EDUCATION/TRAINING PROGRAM

## 2020-12-07 PROCEDURE — 3008F PR BODY MASS INDEX (BMI) DOCUMENTED: ICD-10-PCS | Mod: CPTII,S$GLB,, | Performed by: STUDENT IN AN ORGANIZED HEALTH CARE EDUCATION/TRAINING PROGRAM

## 2020-12-07 PROCEDURE — 99999 PR PBB SHADOW E&M-EST. PATIENT-LVL IV: CPT | Mod: PBBFAC,,, | Performed by: STUDENT IN AN ORGANIZED HEALTH CARE EDUCATION/TRAINING PROGRAM

## 2020-12-07 PROCEDURE — 3078F DIAST BP <80 MM HG: CPT | Mod: CPTII,S$GLB,, | Performed by: STUDENT IN AN ORGANIZED HEALTH CARE EDUCATION/TRAINING PROGRAM

## 2020-12-07 PROCEDURE — 10060 PR DRAIN SKIN ABSCESS SIMPLE: ICD-10-PCS | Mod: S$GLB,,, | Performed by: STUDENT IN AN ORGANIZED HEALTH CARE EDUCATION/TRAINING PROGRAM

## 2020-12-07 PROCEDURE — 99203 PR OFFICE/OUTPT VISIT, NEW, LEVL III, 30-44 MIN: ICD-10-PCS | Mod: 25,S$GLB,, | Performed by: STUDENT IN AN ORGANIZED HEALTH CARE EDUCATION/TRAINING PROGRAM

## 2020-12-07 PROCEDURE — 1125F AMNT PAIN NOTED PAIN PRSNT: CPT | Mod: S$GLB,,, | Performed by: STUDENT IN AN ORGANIZED HEALTH CARE EDUCATION/TRAINING PROGRAM

## 2020-12-07 PROCEDURE — 3074F PR MOST RECENT SYSTOLIC BLOOD PRESSURE < 130 MM HG: ICD-10-PCS | Mod: CPTII,S$GLB,, | Performed by: STUDENT IN AN ORGANIZED HEALTH CARE EDUCATION/TRAINING PROGRAM

## 2020-12-07 PROCEDURE — 3078F PR MOST RECENT DIASTOLIC BLOOD PRESSURE < 80 MM HG: ICD-10-PCS | Mod: CPTII,S$GLB,, | Performed by: STUDENT IN AN ORGANIZED HEALTH CARE EDUCATION/TRAINING PROGRAM

## 2020-12-07 PROCEDURE — 99203 OFFICE O/P NEW LOW 30 MIN: CPT | Mod: 25,S$GLB,, | Performed by: STUDENT IN AN ORGANIZED HEALTH CARE EDUCATION/TRAINING PROGRAM

## 2020-12-07 PROCEDURE — 10060 I&D ABSCESS SIMPLE/SINGLE: CPT | Mod: S$GLB,,, | Performed by: STUDENT IN AN ORGANIZED HEALTH CARE EDUCATION/TRAINING PROGRAM

## 2020-12-07 NOTE — PROGRESS NOTES
History & Physical    SUBJECTIVE:     History of Present Illness:  Patient is a 60 y.o. female presents with a right posterior shoulder sebaceous cyst.  Recently it has become infected and started draining.  The area is painful.  She is not having systemic symptoms.  She desires drainage.  No chief complaint on file.      Review of patient's allergies indicates:   Allergen Reactions    Fish containing products Hives, Itching, Rash and Swelling       Current Outpatient Medications   Medication Sig Dispense Refill    ALPRAZolam (XANAX) 0.25 MG tablet TK 1/2 TO 1 T PO UP TO BID PRN      citalopram (CELEXA) 40 MG tablet TK 1 T PO QD      omega-3 acid ethyl esters (LOVAZA) 1 gram capsule TK 2 CS PO QD      prazosin (MINIPRESS) 1 MG Cap       prazosin (MINIPRESS) 1 MG Cap TK 1 TO 2 CS PO Q NIGHT PRN      triamterene-hydrochlorothiazide 37.5-25 mg (MAXZIDE-25) 37.5-25 mg per tablet Take 1 tablet by mouth once daily.       ZINC PICOLINATE ORAL Take by mouth. Take 1 tab each morning.      alprazolam (XANAX) 1 MG tablet 2 (two) times daily as needed.       citalopram (CELEXA) 10 MG tablet Take 10 mg by mouth once daily.      NURTEC 75 mg odt PLACE 1 T ON THE TONGUE AND ALLOW TO DISSOLVE ONCE D PRN      ramelteon (ROZEREM) 8 mg tablet TK 1 T PO QHS PRN      rizatriptan (MAXALT-MLT) 10 MG disintegrating tablet PLACE 1 T ON THE TONGUE AND ALLOW TO DISSOLVE ONE TIME MAY REPEAT ONCE IN  2 H X2. MAX DAILY DOSE 30MG/24H      SUPREP BOWEL PREP KIT 17.5-3.13-1.6 gram SolR MX AND DRK UTD       No current facility-administered medications for this visit.        Past Medical History:   Diagnosis Date    Anxiety     Depression      Past Surgical History:   Procedure Laterality Date    APPENDECTOMY      BREAST FIBROADENOMA SURGERY Bilateral 1990's    3     CHOLECYSTECTOMY      TONSILLECTOMY       Family History   Problem Relation Age of Onset    Hypertension Father     Heart disease Neg Hx     Heart attack Neg Hx   "    Social History     Tobacco Use    Smoking status: Never Smoker   Substance Use Topics    Alcohol use: Yes     Alcohol/week: 3.0 standard drinks     Types: 3 Glasses of wine per week     Frequency: 2-3 times a week     Drinks per session: 1 or 2     Binge frequency: Never    Drug use: Not on file        Review of Systems:  Review of Systems   Constitutional: Negative for fever.   HENT: Negative.    Eyes: Negative.    Respiratory: Negative.    Cardiovascular: Negative.    Gastrointestinal: Negative.    Endocrine: Negative.    Genitourinary: Negative.    Musculoskeletal: Negative.    Skin: Positive for color change and wound.   Allergic/Immunologic: Negative.    Neurological: Negative.    Hematological: Negative.    Psychiatric/Behavioral: Negative.        OBJECTIVE:     Vital Signs (Most Recent)  Temp: 97.3 °F (36.3 °C) (12/07/20 1328)  Pulse: 82 (12/07/20 1328)  Resp: 16 (12/07/20 1328)  BP: 126/61 (12/07/20 1328)  5' 8" (1.727 m)  88.6 kg (195 lb 5.2 oz)     Physical Exam:  Physical Exam  Vitals signs reviewed.   Constitutional:       General: She is not in acute distress.     Appearance: Normal appearance. She is not ill-appearing, toxic-appearing or diaphoretic.   HENT:      Head: Normocephalic.      Nose: Nose normal.   Eyes:      Conjunctiva/sclera: Conjunctivae normal.   Neck:      Musculoskeletal: Normal range of motion.   Cardiovascular:      Rate and Rhythm: Normal rate and regular rhythm.   Pulmonary:      Effort: Pulmonary effort is normal.   Musculoskeletal: Normal range of motion.   Skin:     General: Skin is warm.      Comments: On the right posterior shoulder there is a 4 cm fluctuant mass with erythema and purulence drainage consistent with an infected sebaceous cyst   Neurological:      General: No focal deficit present.      Mental Status: She is alert.   Psychiatric:         Mood and Affect: Mood normal.           ASSESSMENT/PLAN:     Infected sebaceous cyst of the right posterior " shoulder  PLAN  I and D performed in the clinic see procedure note below  Local wound care  Follow-up 1-2 weeks for wound check    Procedure note  After verbal consent was obtained, the right shoulder was prepped and draped in the usual fashion.  5 cc of local anesthetic with epinephrine were infiltrated in to the dermis.  A 3 cm incision was made over a fluctuant mass.  There was immediate expression of purulence material.  The wound was irrigated copiously and a clean dressing was applied with packing.  Patient tolerated the entire procedure without apparent complication.  She will follow up in 1-2 weeks for wound check.  Instructions for local wound care were provided

## 2020-12-07 NOTE — LETTER
December 7, 2020      Josh Aj MD  [address]  [place] [address]           [facility] - General Surgery  [address]   [place] [address]  Phone: [phone]          Patient: Leticia Zelaya   MR Number: [id number]   YOB: 1960   Date of Visit: 12/7/2020       Dear Dr. Josh Aj:    Thank you for referring Leticia Zelaya to me for evaluation. Attached you will find relevant portions of my assessment and plan of care.    If you have questions, please do not hesitate to call me. I look forward to following Leticia Zelaya along with you.    Sincerely,    Be Palencia MD    Enclosure  CC:  No Recipients    If you would like to receive this communication electronically, please contact [email or web address] or [phone] to request more information on [employer] access.    For providers and/or their staff who would like to refer a patient to [facility], please contact us through our one-stop-shop provider referral line, [facility], at [phone].    If you feel you have received this communication in error or would no longer like to receive these types of communications, please e-mail [email or web address]

## 2020-12-08 ENCOUNTER — PATIENT MESSAGE (OUTPATIENT)
Dept: SURGERY | Facility: CLINIC | Age: 60
End: 2020-12-08

## 2020-12-09 ENCOUNTER — HOSPITAL ENCOUNTER (OUTPATIENT)
Dept: RADIOLOGY | Facility: CLINIC | Age: 60
Discharge: HOME | End: 2020-12-09
Attending: STUDENT IN AN ORGANIZED HEALTH CARE EDUCATION/TRAINING PROGRAM
Payer: COMMERCIAL

## 2020-12-09 DIAGNOSIS — Z00.00 ROUTINE GENERAL MEDICAL EXAMINATION AT A HEALTH CARE FACILITY: ICD-10-CM

## 2020-12-09 PROCEDURE — 77080 DEXA BONE DENSITY SPINE HIP: ICD-10-PCS | Mod: 26,,, | Performed by: RADIOLOGY

## 2020-12-09 PROCEDURE — 77080 DXA BONE DENSITY AXIAL: CPT | Mod: 26,,, | Performed by: RADIOLOGY

## 2020-12-09 PROCEDURE — 77080 DXA BONE DENSITY AXIAL: CPT | Mod: TC,PO

## 2020-12-11 ENCOUNTER — HOSPITAL ENCOUNTER (OUTPATIENT)
Dept: RADIOLOGY | Facility: CLINIC | Age: 60
Discharge: HOME | End: 2020-12-11
Attending: STUDENT IN AN ORGANIZED HEALTH CARE EDUCATION/TRAINING PROGRAM
Payer: COMMERCIAL

## 2020-12-11 DIAGNOSIS — R07.81 RIB PAIN: ICD-10-CM

## 2020-12-11 DIAGNOSIS — Z12.31 ENCOUNTER FOR SCREENING MAMMOGRAM FOR BREAST CANCER: ICD-10-CM

## 2020-12-11 PROCEDURE — 77067 MAMMO DIGITAL SCREENING BILAT WITH TOMO: ICD-10-PCS | Mod: 26,,, | Performed by: RADIOLOGY

## 2020-12-11 PROCEDURE — 71046 XR CHEST PA AND LATERAL: ICD-10-PCS | Mod: 26,,, | Performed by: RADIOLOGY

## 2020-12-11 PROCEDURE — 77063 BREAST TOMOSYNTHESIS BI: CPT | Mod: 26,,, | Performed by: RADIOLOGY

## 2020-12-11 PROCEDURE — 77067 SCR MAMMO BI INCL CAD: CPT | Mod: 26,,, | Performed by: RADIOLOGY

## 2020-12-11 PROCEDURE — 71046 X-RAY EXAM CHEST 2 VIEWS: CPT | Mod: 26,,, | Performed by: RADIOLOGY

## 2020-12-11 PROCEDURE — 71046 X-RAY EXAM CHEST 2 VIEWS: CPT | Mod: TC,FY,PO

## 2020-12-11 PROCEDURE — 77067 SCR MAMMO BI INCL CAD: CPT | Mod: TC,PO

## 2020-12-11 PROCEDURE — 77063 MAMMO DIGITAL SCREENING BILAT WITH TOMO: ICD-10-PCS | Mod: 26,,, | Performed by: RADIOLOGY

## 2020-12-11 RX ORDER — HYDROCODONE BITARTRATE AND ACETAMINOPHEN 5; 325 MG/1; MG/1
1 TABLET ORAL EVERY 8 HOURS PRN
Qty: 10 TABLET | Refills: 0 | Status: SHIPPED | OUTPATIENT
Start: 2020-12-11 | End: 2021-05-24

## 2020-12-12 ENCOUNTER — PATIENT MESSAGE (OUTPATIENT)
Dept: FAMILY MEDICINE | Facility: CLINIC | Age: 60
End: 2020-12-12

## 2020-12-12 DIAGNOSIS — R07.81 RIB PAIN: Primary | ICD-10-CM

## 2020-12-13 ENCOUNTER — PATIENT MESSAGE (OUTPATIENT)
Dept: FAMILY MEDICINE | Facility: CLINIC | Age: 60
End: 2020-12-13

## 2020-12-13 DIAGNOSIS — R07.81 RIB PAIN: Primary | ICD-10-CM

## 2020-12-13 RX ORDER — IBUPROFEN 600 MG/1
600 TABLET ORAL 3 TIMES DAILY
Qty: 21 TABLET | Refills: 0 | Status: SHIPPED | OUTPATIENT
Start: 2020-12-13 | End: 2020-12-15

## 2020-12-14 ENCOUNTER — OFFICE VISIT (OUTPATIENT)
Dept: SURGERY | Facility: CLINIC | Age: 60
End: 2020-12-14
Payer: COMMERCIAL

## 2020-12-14 VITALS
WEIGHT: 195.56 LBS | DIASTOLIC BLOOD PRESSURE: 75 MMHG | HEART RATE: 64 BPM | RESPIRATION RATE: 16 BRPM | SYSTOLIC BLOOD PRESSURE: 140 MMHG | BODY MASS INDEX: 29.64 KG/M2 | HEIGHT: 68 IN | TEMPERATURE: 97 F

## 2020-12-14 DIAGNOSIS — Z51.89 WOUND CHECK, ABSCESS: Primary | ICD-10-CM

## 2020-12-14 PROCEDURE — 1126F AMNT PAIN NOTED NONE PRSNT: CPT | Mod: S$GLB,,, | Performed by: STUDENT IN AN ORGANIZED HEALTH CARE EDUCATION/TRAINING PROGRAM

## 2020-12-14 PROCEDURE — 3008F PR BODY MASS INDEX (BMI) DOCUMENTED: ICD-10-PCS | Mod: CPTII,S$GLB,, | Performed by: STUDENT IN AN ORGANIZED HEALTH CARE EDUCATION/TRAINING PROGRAM

## 2020-12-14 PROCEDURE — 3008F BODY MASS INDEX DOCD: CPT | Mod: CPTII,S$GLB,, | Performed by: STUDENT IN AN ORGANIZED HEALTH CARE EDUCATION/TRAINING PROGRAM

## 2020-12-14 PROCEDURE — 3077F SYST BP >= 140 MM HG: CPT | Mod: CPTII,S$GLB,, | Performed by: STUDENT IN AN ORGANIZED HEALTH CARE EDUCATION/TRAINING PROGRAM

## 2020-12-14 PROCEDURE — 99999 PR PBB SHADOW E&M-EST. PATIENT-LVL IV: CPT | Mod: PBBFAC,,, | Performed by: STUDENT IN AN ORGANIZED HEALTH CARE EDUCATION/TRAINING PROGRAM

## 2020-12-14 PROCEDURE — 99999 PR PBB SHADOW E&M-EST. PATIENT-LVL IV: ICD-10-PCS | Mod: PBBFAC,,, | Performed by: STUDENT IN AN ORGANIZED HEALTH CARE EDUCATION/TRAINING PROGRAM

## 2020-12-14 PROCEDURE — 3078F PR MOST RECENT DIASTOLIC BLOOD PRESSURE < 80 MM HG: ICD-10-PCS | Mod: CPTII,S$GLB,, | Performed by: STUDENT IN AN ORGANIZED HEALTH CARE EDUCATION/TRAINING PROGRAM

## 2020-12-14 PROCEDURE — 99024 PR POST-OP FOLLOW-UP VISIT: ICD-10-PCS | Mod: S$GLB,,, | Performed by: STUDENT IN AN ORGANIZED HEALTH CARE EDUCATION/TRAINING PROGRAM

## 2020-12-14 PROCEDURE — 3077F PR MOST RECENT SYSTOLIC BLOOD PRESSURE >= 140 MM HG: ICD-10-PCS | Mod: CPTII,S$GLB,, | Performed by: STUDENT IN AN ORGANIZED HEALTH CARE EDUCATION/TRAINING PROGRAM

## 2020-12-14 PROCEDURE — 1126F PR PAIN SEVERITY QUANTIFIED, NO PAIN PRESENT: ICD-10-PCS | Mod: S$GLB,,, | Performed by: STUDENT IN AN ORGANIZED HEALTH CARE EDUCATION/TRAINING PROGRAM

## 2020-12-14 PROCEDURE — 99024 POSTOP FOLLOW-UP VISIT: CPT | Mod: S$GLB,,, | Performed by: STUDENT IN AN ORGANIZED HEALTH CARE EDUCATION/TRAINING PROGRAM

## 2020-12-14 PROCEDURE — 3078F DIAST BP <80 MM HG: CPT | Mod: CPTII,S$GLB,, | Performed by: STUDENT IN AN ORGANIZED HEALTH CARE EDUCATION/TRAINING PROGRAM

## 2020-12-14 NOTE — PROGRESS NOTES
Wound check    Patient underwent right shoulder I and D of an abscess.  Pain is improving.  The wound appears to be healing well.  Patient has no complaints at this time.    There is a 1 cm wound with healthy granulation tissue at the base.  Total depth is approximately 0.5 cm.  There is no tunneling or tracking.  Minimal reactive/cellulitic changes at the edges which is expected.  There is no surrounding fluctuance or induration    Overall doing well.  No indication for antibiotics currently.  Continue local wound care with soapy water, daily to b.i.d. dressing changes.

## 2020-12-15 RX ORDER — NAPROXEN 500 MG/1
500 TABLET ORAL 2 TIMES DAILY WITH MEALS
Qty: 28 TABLET | Refills: 0 | Status: SHIPPED | OUTPATIENT
Start: 2020-12-15 | End: 2020-12-27 | Stop reason: SDUPTHER

## 2020-12-15 RX ORDER — PANTOPRAZOLE SODIUM 40 MG/1
40 TABLET, DELAYED RELEASE ORAL DAILY
Qty: 30 TABLET | Refills: 0 | Status: SHIPPED | OUTPATIENT
Start: 2020-12-15 | End: 2021-01-05 | Stop reason: SDUPTHER

## 2020-12-18 ENCOUNTER — CLINICAL SUPPORT (OUTPATIENT)
Dept: REHABILITATION | Facility: HOSPITAL | Age: 60
End: 2020-12-18
Payer: COMMERCIAL

## 2020-12-18 DIAGNOSIS — R07.81 RIB PAIN: ICD-10-CM

## 2020-12-18 PROCEDURE — 97161 PT EVAL LOW COMPLEX 20 MIN: CPT | Mod: PN

## 2020-12-18 PROCEDURE — 97140 MANUAL THERAPY 1/> REGIONS: CPT | Mod: PN,59

## 2020-12-18 NOTE — PLAN OF CARE
[facility]  Physical Therapy Initial Evaluation    Name: Leticia Zelaya  Clinic Number: [id number]    Therapy Diagnosis:   Encounter Diagnosis   Name Primary?    Rib pain      Physician: Josh Aj MD    Physician Orders: PT Eval and Treat   Medical Diagnosis: R07.81 (ICD-10-CM) - Rib pain  Evaluation Date: 12/18/2020  Authorization period Expiration:   Plan of Care Certification Period: 1/27/2021    Visit #: 1/ Visits authorized: 8  Time In:8:20 am  Time Out: 9:01am  Total Billable Time: 41 minutes    Precautions: Standard    Subjective   Date of onset: 11/2018  Date of Surgery: n/a    Past Medical History:   Diagnosis Date    Anxiety     Depression      Leticia Zelaya  has a past surgical history that includes Tonsillectomy; Appendectomy; Cholecystectomy; Breast fibroadenoma surgery (Bilateral, 1990's); and Breast biopsy (N/A).    Leticia has a current medication list which includes the following prescription(s): alprazolam, alprazolam, citalopram, citalopram, hydrocodone-acetaminophen, naproxen, nurtec, omega-3 acid ethyl esters, pantoprazole, prazosin, prazosin, ramelteon, rizatriptan, suprep bowel prep kit, triamterene-hydrochlorothiazide 37.5-25 mg, and zinc.    Review of patient's allergies indicates:   Allergen Reactions    Fish containing products Hives, Itching, Rash and Swelling        Imaging, X-ray: per patient MD messaged her stating rib fracture was healed    Prior Therapy: none for current condition  Social History: Patient lives in a 2 story home with 20 steps to enter unilateral handrail  Occupation: , traveled extensively prior to Covid  Prior Level of Function: Independent, pain with high level activities, reaching, lifting, etc since fall 2 years ago  Current Level of Function: no change    Pain:  Current 3/10, worst 7/10, best 3/10   Location: flank and thoracic spine  right  Description: Aching  Aggravating Factors: Lifting  Easing Factors: rest  and naproxen      Onset/JONATHAN: s/p fall    History of current condition - Suellen reports: she broke a rib about 10 years ago and it took a year for the pain to go away. Then around Thanksgiving 2 years ago she fell and broke another rib (5th rib) and has had pain since. She states X-rays show the fracture has healed but the pain persists. Patient began taking naproxen about a week ago and reports mild relief with it. Patient states during the day it aches during the day, but at night she gets sharp pains when turning. Golfing, lifting, overhead activities, and resisted reaching all increase pain. Patient reports constant pain.    Pts goals: to relieve pain    Objective     Observation: well developed female in no distress    Posture:     -       Rounded shoulders  -       Forward head    Cervical Range of Motion:    Degrees Pain   Flexion 60    Extension 40    Left Rotation 50    Right Rotation 65    Left Side Bending 25    Right Side Bending 20       Shoulder Range of Motion:   Left:  WNL  Right: WNL    Strength:   Left Right   Upper trap 5/5 5/5   Mid trap 5/5 4+/5   Lower trap 5/5 4/5   Rhomboids 5/5 4/5    wnl wnl     Upper Extremity Strength  (L) UE  (R) UE    Shoulder flexion: 5/5 Shoulder flexion: 5/5   Shoulder Abduction: 5/5 Shoulder abduction: 5/5   Shoulder ER 5/5 Shoulder ER 5/5   Shoulder IR 5/5 Shoulder IR 5/5   Elbow flexion: 5/5 Elbow flexion: 5/5   Elbow extension: 5/5 Elbow extension: 5/5   Wrist flexion: 5/5 Wrist flexion: 5/5   Wrist extension: 5/5 Wrist extension: 5/5       Special Tests:    Joint Mobility: step off noted from rib 5 to 6 and 6 to 7, medial rib glides with deep breathing increased pain, gentle posteromedial rib glides in sidelying without deep breathing to rib 6 better tolerated    Thoracic mobility: mildly restricted    Palpation: moderate tenderness to palpation at 5th, 6th, and 7th right rib body posterolaterally, moderate-severe tenderness to palpation at intercostal muscles  between ribs 5-6    Sensation: intact    Flexibility:     Upper Trap = R moderate restriction, L moderate restriction   Scalenes: R minimal restriction, L minimal restriction   SCM: R minimal restriction, L minimal restriction   Levator Scap: R minimal restriction, L moderate restriction      PT Evaluation Completed? Yes  Discussed Plan of Care with patient: Yes    TREATMENT   Treatment Time In: 8:43 am  Treatment Time Out: 9:01 am  Total Treatment time separate from Evaluation: 18 minutes      Suellen received the following manual therapy techniques: Joint mobilizations and Soft tissue Mobilization for 18  minutes.   Medial rib glide to 5th and 6th rib in sitting with deep breathing techniques to facilitate x 4 reps  sidelying gr I-II posteromedial rib glides to 5th, 6th, and 7th ribs without deep breathing technique, inferior glide to 6th rib gr I-II  STM to intercostal muscles of ribs 6-7, ribs 5-6    Home Exercises and Patient Education Provided    Education provided re:   - progress towards goals   - role of therapy in multi - disciplinary team, goals for therapy  - application of cold pack to affected area PRN  - patient receives regular massages, she will inform massage therapist to perform gentle soft tissue work to the intercostals as tolerated, and if unable to tolerate massage the area will be avoided.   Pt educated on condition, POC, and expectations in therapy.  No spiritual or educational barriers to learning provided    Home exercises:  Pt will be provided HEP during course of treatment with progressions as appropriate. Pt was advised to perform these exercises free of pain, and to stop performing them if pain occurs.   Suellen demonstrated good  understanding of the education provided.         Assessment   Leticia is a 60 y.o. female referred to outpatient physical therapy with a medical diagnosis of R07.81 (ICD-10-CM) - Rib pain, and presents to PT with pain in right ribs limiting functional use of RUE .  Patient demonstrates limitations as described in the problem list. Pt will benefit from physcial therapy services in order to maximize pain free and/or functional use of right UE. The following goals were discussed with the patient and patient is in agreement with them as to be addressed in the treatment plan.   Pt prognosis is Good.   Pt will benefit from skilled outpatient Physical Therapy to address the deficits stated above and in the chart below, provide pt/family education, and to maximize pt's level of independence.     Plan of care discussed with patient: Yes  Pt's spiritual, cultural and educational needs considered and pt agreeable to plan of care and goals as stated below:     Anticipated Barriers for therapy: none identified at this time    Medical necessity is demonstrated by the following IMPAIRMENTS/PROBLEM LIST:    weakness, decreased upper extremity function, pain, decreased ROM, impaired joint extensibility and impaired muscle length      Medical Necessity is demonstrated by the following  History  Co-morbidities and personal factors that may impact the plan of care Co-morbidities:   chronic neck pain, history of shoulder surgery    Personal Factors:   no deficits  0= low, 1-2 = mod, 3+ = high   low   Examination  Body Structures and Functions, activity limitations and participation restrictions that may impact the plan of care Body Regions:   trunk    Body Systems:    strength    Participation Restrictions:   none    Activity limitations:   Mobility  lifting and carrying objects  no deficits    Self care  no deficits    Domestic Life  no deficits    Community and Social Life  no deficits  1-2 = low, 3+ = mod, 4+ = high       low   Clinical Presentation stable and uncomplicated  Stable/uncomplicated = low, evolving/changing = mod,  Unstable/unpredictable = high low   Decision Making/ Complexity Score: low           Goals     Short Term Goals: 2 weeks  Pain; Decrease pain to 0/10 25% of the time    Exercises: demonstrate compliance with initial HEP    Long Term Goals: 4 weeks  Pain: Decrease pain to 0/1050% of the time, 3/10 at worst after activities  Strength: Improve strength in mid-trap and rhomboids to 4+/5 for improved stability  Lifting: Lift 15 lbs to waist level from floor, 15 lbs to shoulder level, 15 lbs to overhead using BUEs without pain or compensation  Exercise: demonstrate independence with home exercise program to maintain gains made in therapy.          Plan   Certification Period: 12/18/2020 to 1/27/2021.    Outpatient Physical Therapy 2 times weekly for 4 weeks to include the following interventions: patient education, Manual Therapy, Moist Heat/ Ice, Neuromuscular Re-ed, Therapeutic Activites, Therapeutic Exercise and Ultrasound.   Pt may be seen by PTA as part of the rehabilitation team.     I certify the need for these services furnished under this plan of treatment and while under my care.    Enedina Gonzalez, PT          Attestation:   I have seen the patient, reviewed the therapist's plan of care, and I agree with the plan of care.   I certify the need for these services furnished under this plan of treatment and while under my care.         _______________            ________                                               _____________________  Physician/Referring Practitioner                                                            Date of Signature

## 2020-12-21 ENCOUNTER — CLINICAL SUPPORT (OUTPATIENT)
Dept: REHABILITATION | Facility: HOSPITAL | Age: 60
End: 2020-12-21
Payer: COMMERCIAL

## 2020-12-21 DIAGNOSIS — R07.81 RIB PAIN: ICD-10-CM

## 2020-12-21 PROCEDURE — 97140 MANUAL THERAPY 1/> REGIONS: CPT | Mod: PN

## 2020-12-21 NOTE — PROGRESS NOTES
"  Physical Therapy Treatment Note     Name: Leticia FLOREZ [facility] Number: [id number]    Therapy Diagnosis:   Encounter Diagnosis   Name Primary?    Rib pain      Physician: Josh Aj MD    Visit Date: 12/21/2020     Physician Orders: PT Eval and Treat   Medical Diagnosis: R07.81 (ICD-10-CM) - Rib pain  Evaluation Date: 12/18/2020  Authorization period Expiration:   Plan of Care Certification Period: 1/27/2021  Visit #: 2/ Visits authorized: 8         Time In: 0946  Time Out: 1000  Total Billable Time: 14 minutes    Precautions: Standard    Subjective     Pt reports: "I was sore from the evening after my session, until this morning. Just in time for you to irritate it again" Patient is hopeful about progress thus far  Response to previous treatment: Increased discomfort, however, after alternating use of CP and MHP pain subsided.  Functional change: Decrease in pain today.    Pain: 2-3/10  Location: right ribs      Objective   Suellen received the following manual therapy techniques: Manual traction, Myofacial release and Soft tissue Mobilization were applied to the: R mid ribs(5-7) on lateral aspect for 14 minutes, including:  Gentle R rib cage distraction  STM to intercostal cartilage ribs 6-7; ribs 5-6      Assessment     Suellen tolerated manual therapy with increased pain when over area of discomfort, Gentle pressure with distraction as well as with STM as patient was very tender to the touch    Suellen is progressing towards her goals.   Pt prognosis is Good.     Pt will continue to benefit from skilled outpatient physical therapy to address the deficits listed in the problem list box on initial evaluation, provide pt/family education and to maximize pt's level of independence in the home and community environment.     Pt's spiritual, cultural and educational needs considered and pt agreeable to plan of care and goals.     Anticipated barriers to physical therapy: None    Goals:   Short Term Goals: 2 weeks  Pain; " Decrease pain to 0/10 25% of the time (progressing)   Exercises: demonstrate compliance with initial HEP (progressing)     Long Term Goals: 4 weeks  Pain: Decrease pain to 0/1050% of the time, 3/10 at worst after activities(progressing)  Strength: Improve strength in mid-trap and rhomboids to 4+/5 for improved stability(progressing)  Lifting: Lift 15 lbs to waist level from floor, 15 lbs to shoulder level, 15 lbs to overhead using BUEs without pain or compensation(progressing)  Exercise: demonstrate independence with home exercise program to maintain gains made in therapy.  (progressing)      Plan     Supervising PT to take over session for mobilizations and further JEANNIE Cueva, PTA

## 2020-12-21 NOTE — PROGRESS NOTES
Physical Therapy Treatment Note     Name: Leticia FLOREZ [facility] Number: [id number]    Therapy Diagnosis:   Encounter Diagnosis   Name Primary?    Rib pain      Physician: Josh Aj MD    Visit Date: 12/21/2020       Physician Orders: PT Eval and Treat   Medical Diagnosis: R07.81 (ICD-10-CM) - Rib pain  Evaluation Date: 12/18/2020  Authorization period Expiration:   Plan of Care Certification Period: 1/27/2021  Visit #: 2/ Visits authorized: 8    Time In: 10  Time Out: 1015  Total Billable Time: 15 minutes    Precautions: Standard    Subjective     Pt reports: She's been using heat and ice intermittently throughout the weekend.  She was not given home exercise program.  Response to previous treatment: increased pain   Functional change: none yet    Pain: 3/10  Location: right ribs      Objective     Suellen received the following manual therapy techniques: Joint mobilizations and Soft tissue Mobilization were applied to the: right ribs 5, 6, 7 for 15 minutes, including:  sidelying gr I-II posteromedial rib glides to 5th, 6th, and 7th ribs without deep breathing technique, inferior glide to 6th rib gr I-II  Deep breathing with manual blocking lateral translation 5th rib 3 breaths x 2  STM to intercostal muscles of ribs 6-7, ribs 5-6    Patient declined CP at end of session.       Home Exercises Provided and Patient Education Provided     Education provided:   - Education provided re:   - progress towards goals   - role of therapy in multi - disciplinary team, goals for therapy  Pt educated on condition, POC, and expectations in therapy.  No spiritual or educational barriers to learning provided      Assessment     Patient with better tolerance to manual this date, yet still unable to tolerate greater than grade II mobilization as she continues with palpable tenderness throughout area.   Suellen is progressing well towards her goals.   Pt prognosis is Good.     Pt will continue to benefit from skilled outpatient  physical therapy to address the deficits listed in the problem list box on initial evaluation, provide pt/family education and to maximize pt's level of independence in the home and community environment.     Pt's spiritual, cultural and educational needs considered and pt agreeable to plan of care and goals.     Anticipated barriers to physical therapy: none    Goals:   Short Term Goals: 2 weeks  Pain; Decrease pain to 0/10 25% of the time   Exercises: demonstrate compliance with initial HEP     Long Term Goals: 4 weeks  Pain: Decrease pain to 0/1050% of the time, 3/10 at worst after activities  Strength: Improve strength in mid-trap and rhomboids to 4+/5 for improved stability  Lifting: Lift 15 lbs to waist level from floor, 15 lbs to shoulder level, 15 lbs to overhead using BUEs without pain or compensation  Exercise: demonstrate independence with home exercise program to maintain gains made in therapy.         Plan     Continue to focus on manual treatment until pain reports decrease    Enedina Gonzalez, PT

## 2020-12-29 ENCOUNTER — CLINICAL SUPPORT (OUTPATIENT)
Dept: REHABILITATION | Facility: HOSPITAL | Age: 60
End: 2020-12-29
Payer: COMMERCIAL

## 2020-12-29 DIAGNOSIS — R07.81 RIB PAIN: ICD-10-CM

## 2020-12-29 PROCEDURE — 97110 THERAPEUTIC EXERCISES: CPT | Mod: PN,CQ

## 2020-12-29 NOTE — PROGRESS NOTES
"  Physical Therapy Treatment Note     Name: Leticia FLOREZ [facility] Number: [id number]    Therapy Diagnosis:   Encounter Diagnosis   Name Primary?    Rib pain      Physician: Josh Aj MD    Visit Date: 12/29/2020    Physician Orders: PT Eval and Treat   Medical Diagnosis: R07.81 (ICD-10-CM) - Rib pain  Evaluation Date: 12/18/2020  Authorization period Expiration:   Plan of Care Certification Period: 1/27/2021  Visit #: 3/ Visits authorized: 8    Time In: 0825  Time Out: 0910  Total Billable Time: 45 minutes    Precautions: Standard    Subjective     Pt reports: Had muscle spasms in the night and took pain meds this a.m..  She had not received compliant with home exercise program.  Response to previous treatment: no problem  Functional change: none stated    Pain: 4/10  Location: right trunk     Objective     Suellen received therapeutic exercises to develop strength, endurance, flexibility and core stabilization for 45 minutes including:    NuStep Lv1 10' (with cues to decrease speed once discomfort set in)  Mat ex:Ministerio pose 30": ant, R, L   Supine LTR x10 with gentle stretch at end range   Supine LTR x30 with Tball with cues to maintain control with abdominal muscles(dec. range)   Supine DKTC x30 with Tball   Supine SLR 2x10 R/L   Prone hamstring curl x20 R/L alternating with cues for gentle hip flexor stretch at end range    Red Tband x20 B: Rows     High rows     Extension to sides     Adduction R/L      Home Exercises Provided and Patient Education Provided     Education provided:   - Decrease speed and increase gentle stretch at end range, rest break between each rep    Written Home Exercises Provided: yes.  Exercises were reviewed and Suellen was able to demonstrate them prior to the end of the session.  Suellen demonstrated good  understanding of the education provided.     See EMR under Media for exercises provided 12/29/2020.    Assessment     Patient tolerated activities with cues for direction and proper " form.     Suellen is progressing towards her goals.   Pt prognosis is Good.     Pt will continue to benefit from skilled outpatient physical therapy to address the deficits listed in the problem list box on initial evaluation, provide pt/family education and to maximize pt's level of independence in the home and community environment.     Pt's spiritual, cultural and educational needs considered and pt agreeable to plan of care and goals.     Anticipated barriers to physical therapy: none    Goals:     Short Term Goals: 2 weeks  Pain; Decrease pain to 0/10 25% of the time   Exercises: demonstrate compliance with initial HEP (progressing)     Long Term Goals: 4 weeks  Pain: Decrease pain to 0/1050% of the time, 3/10 at worst after activities  Strength: Improve strength in mid-trap and rhomboids to 4+/5 for improved stability  Lifting: Lift 15 lbs to waist level from floor, 15 lbs to shoulder level, 15 lbs to overhead using BUEs without pain or compensation  Exercise: demonstrate independence with home exercise program to maintain gains made in therapy.       Plan     Continue with POC to increase activities as patient tolerates.    Tosin Shoemaker, PTA

## 2020-12-31 ENCOUNTER — CLINICAL SUPPORT (OUTPATIENT)
Dept: REHABILITATION | Facility: HOSPITAL | Age: 60
End: 2020-12-31
Payer: COMMERCIAL

## 2020-12-31 DIAGNOSIS — R07.81 RIB PAIN: ICD-10-CM

## 2020-12-31 PROCEDURE — 97110 THERAPEUTIC EXERCISES: CPT | Mod: PN,CQ

## 2020-12-31 NOTE — PROGRESS NOTES
"  Physical Therapy Treatment Note     Name: Leticia FLOREZ [facility] Number: [id number]    Therapy Diagnosis:   Encounter Diagnosis   Name Primary?    Rib pain      Physician: Josh Aj MD    Visit Date: 12/31/2020    Physician Orders: PT Eval and Treat   Medical Diagnosis: R07.81 (ICD-10-CM) - Rib pain  Evaluation Date: 12/18/2020  Authorization period Expiration:   Plan of Care Certification Period: 1/27/2021  Visit #: 4/ Visits authorized: 8      Time In: 0823  Time Out: 0905  Total Billable Time: 42 minutes    Precautions: Standard    Subjective     Pt reports: Doing better..  She was compliant with home exercise program.  Response to previous treatment: some soreness  Functional change: improved sleep past few nights    Pain: 2/10  Location: right trunk     Objective     Suellen received therapeutic exercises to develop strength, endurance, flexibility and core stabilization for 42 minutes including:     UBE 3'/3'  Mat: PPT with ball squeeze x30   Hip flexor stretch supine with opposite SKTC 60" R/L   LTR with Tball x30 R/L   DKTC with Tball x30  Trunk flexion in sit with Tball x10: ant, R/L  Red Tband x20 B:       Rows                                      High rows                                      Extension to sides                                      Adduction R/L  Hamstring stretch 2x30" sit with stool R/L  Hamstring stretch 2x30" sit with stool R/L  Prone on plinth 3x10 1# B: horiz abd and Y    Home Exercises Provided and Patient Education Provided     Education provided:   - Decreased speed with exercises to ensure no pain at end range.    Written Home Exercises Provided: Patient instructed to cont prior HEP.  Exercises were reviewed and Suellen was able to demonstrate them prior to the end of the session.  Suellen demonstrated good  understanding of the education provided.     See EMR under Patient Instructions for exercises provided prior visit.    Assessment     Patient tolerated activities with cues " for proper form and direction.    Suellen is progressing well towards her goals.   Pt prognosis is Good.     Pt will continue to benefit from skilled outpatient physical therapy to address the deficits listed in the problem list box on initial evaluation, provide pt/family education and to maximize pt's level of independence in the home and community environment.     Pt's spiritual, cultural and educational needs considered and pt agreeable to plan of care and goals.     Anticipated barriers to physical therapy: none    Goals:     Short Term Goals: 2 weeks  Pain; Decrease pain to 0/10 25% of the time   Exercises: demonstrate compliance with initial HEP (progressing)     Long Term Goals: 4 weeks  Pain: Decrease pain to 0/1050% of the time, 3/10 at worst after activities  Strength: Improve strength in mid-trap and rhomboids to 4+/5 for improved stability  Lifting: Lift 15 lbs to waist level from floor, 15 lbs to shoulder level, 15 lbs to overhead using BUEs without pain or compensation  Exercise: demonstrate independence with home exercise program to maintain gains made in therapy.       Plan     Continue with POC to increase activity endurance as patient tolerates.    Tosin Shoemaker, PTA

## 2021-01-04 ENCOUNTER — CLINICAL SUPPORT (OUTPATIENT)
Dept: REHABILITATION | Facility: HOSPITAL | Age: 61
End: 2021-01-04
Payer: COMMERCIAL

## 2021-01-04 ENCOUNTER — PATIENT MESSAGE (OUTPATIENT)
Dept: ADMINISTRATIVE | Facility: HOSPITAL | Age: 61
End: 2021-01-04

## 2021-01-04 DIAGNOSIS — R07.81 RIB PAIN: ICD-10-CM

## 2021-01-04 PROCEDURE — 97110 THERAPEUTIC EXERCISES: CPT | Mod: PN,CQ

## 2021-01-08 ENCOUNTER — CLINICAL SUPPORT (OUTPATIENT)
Dept: REHABILITATION | Facility: HOSPITAL | Age: 61
End: 2021-01-08
Payer: COMMERCIAL

## 2021-01-08 DIAGNOSIS — R07.81 RIB PAIN: ICD-10-CM

## 2021-01-08 PROCEDURE — 97110 THERAPEUTIC EXERCISES: CPT | Mod: PN

## 2021-01-11 ENCOUNTER — CLINICAL SUPPORT (OUTPATIENT)
Dept: REHABILITATION | Facility: HOSPITAL | Age: 61
End: 2021-01-11
Payer: COMMERCIAL

## 2021-01-11 ENCOUNTER — TELEPHONE (OUTPATIENT)
Dept: RADIOLOGY | Facility: HOSPITAL | Age: 61
End: 2021-01-11

## 2021-01-11 DIAGNOSIS — R92.8 ABNORMAL MAMMOGRAM: Primary | ICD-10-CM

## 2021-01-11 DIAGNOSIS — R07.81 RIB PAIN: Primary | ICD-10-CM

## 2021-01-11 PROCEDURE — 97110 THERAPEUTIC EXERCISES: CPT | Mod: PN

## 2021-01-14 ENCOUNTER — CLINICAL SUPPORT (OUTPATIENT)
Dept: REHABILITATION | Facility: HOSPITAL | Age: 61
End: 2021-01-14
Payer: COMMERCIAL

## 2021-01-14 DIAGNOSIS — R07.81 RIB PAIN: Primary | ICD-10-CM

## 2021-01-14 PROCEDURE — 97110 THERAPEUTIC EXERCISES: CPT | Mod: PN

## 2021-01-28 ENCOUNTER — HOSPITAL ENCOUNTER (OUTPATIENT)
Dept: RADIOLOGY | Facility: HOSPITAL | Age: 61
Discharge: HOME | End: 2021-01-28
Attending: STUDENT IN AN ORGANIZED HEALTH CARE EDUCATION/TRAINING PROGRAM
Payer: COMMERCIAL

## 2021-01-28 DIAGNOSIS — R92.8 ABNORMAL MAMMOGRAM: ICD-10-CM

## 2021-01-28 PROCEDURE — 77061 BREAST TOMOSYNTHESIS UNI: CPT | Mod: TC,RT

## 2021-01-28 PROCEDURE — 76642 ULTRASOUND BREAST LIMITED: CPT | Mod: TC,RT

## 2021-01-28 PROCEDURE — 76642 US BREAST RIGHT LIMITED: ICD-10-PCS | Mod: 26,RT,, | Performed by: RADIOLOGY

## 2021-01-28 PROCEDURE — 77061 BREAST TOMOSYNTHESIS UNI: CPT | Mod: 26,RT,, | Performed by: RADIOLOGY

## 2021-01-28 PROCEDURE — 77065 DX MAMMO INCL CAD UNI: CPT | Mod: 26,RT,, | Performed by: RADIOLOGY

## 2021-01-28 PROCEDURE — 76642 ULTRASOUND BREAST LIMITED: CPT | Mod: 26,RT,, | Performed by: RADIOLOGY

## 2021-01-28 PROCEDURE — 77065 MAMMO DIGITAL DIAGNOSTIC RIGHT WITH TOMO: ICD-10-PCS | Mod: 26,RT,, | Performed by: RADIOLOGY

## 2021-01-28 PROCEDURE — 77061 MAMMO DIGITAL DIAGNOSTIC RIGHT WITH TOMO: ICD-10-PCS | Mod: 26,RT,, | Performed by: RADIOLOGY

## 2021-02-02 ENCOUNTER — TELEPHONE (OUTPATIENT)
Dept: RADIOLOGY | Facility: HOSPITAL | Age: 61
End: 2021-02-02

## 2021-03-12 ENCOUNTER — IMMUNIZATION (OUTPATIENT)
Dept: PRIMARY CARE CLINIC | Facility: CLINIC | Age: 61
End: 2021-03-12
Payer: COMMERCIAL

## 2021-03-12 DIAGNOSIS — Z23 NEED FOR VACCINATION: Primary | ICD-10-CM

## 2021-03-12 PROCEDURE — 0011A COVID-19, MRNA, LNP-S, PF, 100 MCG/0.5 ML DOSE VACCINE: ICD-10-PCS | Mod: CV19,,, | Performed by: EMERGENCY MEDICINE

## 2021-03-12 PROCEDURE — 91301 COVID-19, MRNA, LNP-S, PF, 100 MCG/0.5 ML DOSE VACCINE: ICD-10-PCS | Mod: ,,, | Performed by: EMERGENCY MEDICINE

## 2021-03-12 PROCEDURE — 0011A COVID-19, MRNA, LNP-S, PF, 100 MCG/0.5 ML DOSE VACCINE: CPT | Mod: CV19,,, | Performed by: EMERGENCY MEDICINE

## 2021-03-12 PROCEDURE — 91301 COVID-19, MRNA, LNP-S, PF, 100 MCG/0.5 ML DOSE VACCINE: CPT | Mod: ,,, | Performed by: EMERGENCY MEDICINE

## 2021-03-26 ENCOUNTER — PATIENT MESSAGE (OUTPATIENT)
Dept: FAMILY MEDICINE | Facility: CLINIC | Age: 61
End: 2021-03-26

## 2021-04-05 ENCOUNTER — PATIENT MESSAGE (OUTPATIENT)
Dept: ADMINISTRATIVE | Facility: HOSPITAL | Age: 61
End: 2021-04-05

## 2021-04-09 ENCOUNTER — IMMUNIZATION (OUTPATIENT)
Dept: PRIMARY CARE CLINIC | Facility: CLINIC | Age: 61
End: 2021-04-09
Payer: COMMERCIAL

## 2021-04-09 DIAGNOSIS — Z23 NEED FOR VACCINATION: Primary | ICD-10-CM

## 2021-04-09 PROCEDURE — 0012A COVID-19, MRNA, LNP-S, PF, 100 MCG/0.5 ML DOSE VACCINE: CPT | Mod: PBBFAC | Performed by: EMERGENCY MEDICINE

## 2021-04-27 ENCOUNTER — OFFICE VISIT (OUTPATIENT)
Dept: DERMATOLOGY | Facility: CLINIC | Age: 61
End: 2021-04-27
Payer: COMMERCIAL

## 2021-04-27 DIAGNOSIS — L72.3 INFLAMED EPIDERMOID CYST OF SKIN: Primary | ICD-10-CM

## 2021-04-27 PROCEDURE — 99999 PR PBB SHADOW E&M-EST. PATIENT-LVL III: ICD-10-PCS | Mod: PBBFAC,,, | Performed by: STUDENT IN AN ORGANIZED HEALTH CARE EDUCATION/TRAINING PROGRAM

## 2021-04-27 PROCEDURE — 10060 PR DRAIN SKIN ABSCESS SIMPLE: ICD-10-PCS | Mod: S$GLB,,, | Performed by: STUDENT IN AN ORGANIZED HEALTH CARE EDUCATION/TRAINING PROGRAM

## 2021-04-27 PROCEDURE — 99499 NO LOS: ICD-10-PCS | Mod: S$GLB,,, | Performed by: STUDENT IN AN ORGANIZED HEALTH CARE EDUCATION/TRAINING PROGRAM

## 2021-04-27 PROCEDURE — 1126F PR PAIN SEVERITY QUANTIFIED, NO PAIN PRESENT: ICD-10-PCS | Mod: S$GLB,,, | Performed by: STUDENT IN AN ORGANIZED HEALTH CARE EDUCATION/TRAINING PROGRAM

## 2021-04-27 PROCEDURE — 99999 PR PBB SHADOW E&M-EST. PATIENT-LVL III: CPT | Mod: PBBFAC,,, | Performed by: STUDENT IN AN ORGANIZED HEALTH CARE EDUCATION/TRAINING PROGRAM

## 2021-04-27 PROCEDURE — 99499 UNLISTED E&M SERVICE: CPT | Mod: S$GLB,,, | Performed by: STUDENT IN AN ORGANIZED HEALTH CARE EDUCATION/TRAINING PROGRAM

## 2021-04-27 PROCEDURE — 1126F AMNT PAIN NOTED NONE PRSNT: CPT | Mod: S$GLB,,, | Performed by: STUDENT IN AN ORGANIZED HEALTH CARE EDUCATION/TRAINING PROGRAM

## 2021-04-27 PROCEDURE — 87070 CULTURE OTHR SPECIMN AEROBIC: CPT | Performed by: STUDENT IN AN ORGANIZED HEALTH CARE EDUCATION/TRAINING PROGRAM

## 2021-04-27 PROCEDURE — 10060 I&D ABSCESS SIMPLE/SINGLE: CPT | Mod: S$GLB,,, | Performed by: STUDENT IN AN ORGANIZED HEALTH CARE EDUCATION/TRAINING PROGRAM

## 2021-04-27 RX ORDER — DOXYCYCLINE 100 MG/1
100 CAPSULE ORAL 2 TIMES DAILY
Qty: 20 CAPSULE | Refills: 0 | Status: SHIPPED | OUTPATIENT
Start: 2021-04-27 | End: 2021-05-07

## 2021-04-27 RX ORDER — GABAPENTIN 100 MG/1
100 CAPSULE ORAL 3 TIMES DAILY
COMMUNITY

## 2021-04-30 LAB — BACTERIA SPEC AEROBE CULT: NORMAL

## 2021-05-24 ENCOUNTER — OFFICE VISIT (OUTPATIENT)
Dept: FAMILY MEDICINE | Facility: CLINIC | Age: 61
End: 2021-05-24
Payer: COMMERCIAL

## 2021-05-24 VITALS
BODY MASS INDEX: 30.74 KG/M2 | OXYGEN SATURATION: 96 % | DIASTOLIC BLOOD PRESSURE: 84 MMHG | RESPIRATION RATE: 18 BRPM | SYSTOLIC BLOOD PRESSURE: 112 MMHG | TEMPERATURE: 98 F | WEIGHT: 202.81 LBS | HEIGHT: 68 IN | HEART RATE: 70 BPM

## 2021-05-24 DIAGNOSIS — H25.9 AGE-RELATED CATARACT OF BOTH EYES, UNSPECIFIED AGE-RELATED CATARACT TYPE: ICD-10-CM

## 2021-05-24 DIAGNOSIS — Z01.818 PRE-OP EVALUATION: Primary | ICD-10-CM

## 2021-05-24 PROCEDURE — 3008F BODY MASS INDEX DOCD: CPT | Mod: CPTII,S$GLB,, | Performed by: STUDENT IN AN ORGANIZED HEALTH CARE EDUCATION/TRAINING PROGRAM

## 2021-05-24 PROCEDURE — 1126F AMNT PAIN NOTED NONE PRSNT: CPT | Mod: S$GLB,,, | Performed by: STUDENT IN AN ORGANIZED HEALTH CARE EDUCATION/TRAINING PROGRAM

## 2021-05-24 PROCEDURE — 99999 PR PBB SHADOW E&M-EST. PATIENT-LVL V: ICD-10-PCS | Mod: PBBFAC,,, | Performed by: STUDENT IN AN ORGANIZED HEALTH CARE EDUCATION/TRAINING PROGRAM

## 2021-05-24 PROCEDURE — 99213 OFFICE O/P EST LOW 20 MIN: CPT | Mod: S$GLB,,, | Performed by: STUDENT IN AN ORGANIZED HEALTH CARE EDUCATION/TRAINING PROGRAM

## 2021-05-24 PROCEDURE — 99213 PR OFFICE/OUTPT VISIT, EST, LEVL III, 20-29 MIN: ICD-10-PCS | Mod: S$GLB,,, | Performed by: STUDENT IN AN ORGANIZED HEALTH CARE EDUCATION/TRAINING PROGRAM

## 2021-05-24 PROCEDURE — 1126F PR PAIN SEVERITY QUANTIFIED, NO PAIN PRESENT: ICD-10-PCS | Mod: S$GLB,,, | Performed by: STUDENT IN AN ORGANIZED HEALTH CARE EDUCATION/TRAINING PROGRAM

## 2021-05-24 PROCEDURE — 3008F PR BODY MASS INDEX (BMI) DOCUMENTED: ICD-10-PCS | Mod: CPTII,S$GLB,, | Performed by: STUDENT IN AN ORGANIZED HEALTH CARE EDUCATION/TRAINING PROGRAM

## 2021-05-24 PROCEDURE — 99999 PR PBB SHADOW E&M-EST. PATIENT-LVL V: CPT | Mod: PBBFAC,,, | Performed by: STUDENT IN AN ORGANIZED HEALTH CARE EDUCATION/TRAINING PROGRAM

## 2021-05-24 RX ORDER — BUTALBITAL, ASPIRIN, CAFFEINE AND CODEINE PHOSPHATE 50; 325; 40; 30 MG/1; MG/1; MG/1; MG/1
1 CAPSULE ORAL
COMMUNITY
Start: 2021-04-05 | End: 2021-05-24 | Stop reason: ALTCHOICE

## 2021-05-24 RX ORDER — SUMATRIPTAN 20 MG/1
SPRAY NASAL
COMMUNITY
Start: 2021-02-01

## 2021-07-07 ENCOUNTER — PATIENT MESSAGE (OUTPATIENT)
Dept: ADMINISTRATIVE | Facility: HOSPITAL | Age: 61
End: 2021-07-07

## 2021-09-23 ENCOUNTER — PATIENT OUTREACH (OUTPATIENT)
Dept: ADMINISTRATIVE | Facility: OTHER | Age: 61
End: 2021-09-23

## 2021-09-23 ENCOUNTER — OFFICE VISIT (OUTPATIENT)
Dept: OBSTETRICS AND GYNECOLOGY | Facility: CLINIC | Age: 61
End: 2021-09-23
Payer: COMMERCIAL

## 2021-09-23 VITALS
SYSTOLIC BLOOD PRESSURE: 132 MMHG | BODY MASS INDEX: 31.37 KG/M2 | DIASTOLIC BLOOD PRESSURE: 80 MMHG | WEIGHT: 207 LBS | HEIGHT: 68 IN

## 2021-09-23 DIAGNOSIS — N90.7 SEBACEOUS CYST OF LABIA: ICD-10-CM

## 2021-09-23 DIAGNOSIS — N95.2 VAGINAL ATROPHY: ICD-10-CM

## 2021-09-23 DIAGNOSIS — Z12.4 CERVICAL CANCER SCREENING: ICD-10-CM

## 2021-09-23 DIAGNOSIS — Z01.411 ENCOUNTER FOR GYNECOLOGICAL EXAMINATION (GENERAL) (ROUTINE) WITH ABNORMAL FINDINGS: Primary | ICD-10-CM

## 2021-09-23 DIAGNOSIS — N94.10 DYSPAREUNIA, FEMALE: ICD-10-CM

## 2021-09-23 PROCEDURE — 1160F PR REVIEW ALL MEDS BY PRESCRIBER/CLIN PHARMACIST DOCUMENTED: ICD-10-PCS | Mod: CPTII,S$GLB,, | Performed by: OBSTETRICS & GYNECOLOGY

## 2021-09-23 PROCEDURE — 3075F PR MOST RECENT SYSTOLIC BLOOD PRESS GE 130-139MM HG: ICD-10-PCS | Mod: CPTII,S$GLB,, | Performed by: OBSTETRICS & GYNECOLOGY

## 2021-09-23 PROCEDURE — 3079F DIAST BP 80-89 MM HG: CPT | Mod: CPTII,S$GLB,, | Performed by: OBSTETRICS & GYNECOLOGY

## 2021-09-23 PROCEDURE — 1159F MED LIST DOCD IN RCRD: CPT | Mod: CPTII,S$GLB,, | Performed by: OBSTETRICS & GYNECOLOGY

## 2021-09-23 PROCEDURE — 99386 PREV VISIT NEW AGE 40-64: CPT | Mod: 25,S$GLB,, | Performed by: OBSTETRICS & GYNECOLOGY

## 2021-09-23 PROCEDURE — 88175 CYTOPATH C/V AUTO FLUID REDO: CPT | Performed by: OBSTETRICS & GYNECOLOGY

## 2021-09-23 PROCEDURE — 87624 HPV HI-RISK TYP POOLED RSLT: CPT | Performed by: OBSTETRICS & GYNECOLOGY

## 2021-09-23 PROCEDURE — 3075F SYST BP GE 130 - 139MM HG: CPT | Mod: CPTII,S$GLB,, | Performed by: OBSTETRICS & GYNECOLOGY

## 2021-09-23 PROCEDURE — 3008F PR BODY MASS INDEX (BMI) DOCUMENTED: ICD-10-PCS | Mod: CPTII,S$GLB,, | Performed by: OBSTETRICS & GYNECOLOGY

## 2021-09-23 PROCEDURE — 99999 PR PBB SHADOW E&M-EST. PATIENT-LVL III: CPT | Mod: PBBFAC,,, | Performed by: OBSTETRICS & GYNECOLOGY

## 2021-09-23 PROCEDURE — 1159F PR MEDICATION LIST DOCUMENTED IN MEDICAL RECORD: ICD-10-PCS | Mod: CPTII,S$GLB,, | Performed by: OBSTETRICS & GYNECOLOGY

## 2021-09-23 PROCEDURE — 99386 PR PREVENTIVE VISIT,NEW,40-64: ICD-10-PCS | Mod: 25,S$GLB,, | Performed by: OBSTETRICS & GYNECOLOGY

## 2021-09-23 PROCEDURE — 99999 PR PBB SHADOW E&M-EST. PATIENT-LVL III: ICD-10-PCS | Mod: PBBFAC,,, | Performed by: OBSTETRICS & GYNECOLOGY

## 2021-09-23 PROCEDURE — 1160F RVW MEDS BY RX/DR IN RCRD: CPT | Mod: CPTII,S$GLB,, | Performed by: OBSTETRICS & GYNECOLOGY

## 2021-09-23 PROCEDURE — 3008F BODY MASS INDEX DOCD: CPT | Mod: CPTII,S$GLB,, | Performed by: OBSTETRICS & GYNECOLOGY

## 2021-09-23 PROCEDURE — 3079F PR MOST RECENT DIASTOLIC BLOOD PRESSURE 80-89 MM HG: ICD-10-PCS | Mod: CPTII,S$GLB,, | Performed by: OBSTETRICS & GYNECOLOGY

## 2021-09-23 RX ORDER — CLONAZEPAM 0.5 MG/1
TABLET ORAL
COMMUNITY
Start: 2021-09-14 | End: 2022-07-28

## 2021-09-23 RX ORDER — SUVOREXANT 10 MG/1
TABLET, FILM COATED ORAL
COMMUNITY
Start: 2021-09-14 | End: 2022-03-04

## 2021-09-23 RX ORDER — VORTIOXETINE 20 MG/1
1 TABLET, FILM COATED ORAL DAILY
COMMUNITY
Start: 2021-09-16 | End: 2022-12-29

## 2021-09-23 RX ORDER — PRAZOSIN HYDROCHLORIDE 2 MG/1
2-4 CAPSULE ORAL NIGHTLY
COMMUNITY
Start: 2021-09-03 | End: 2022-07-28

## 2021-09-23 RX ORDER — FLUTICASONE PROPIONATE 50 MCG
SPRAY, SUSPENSION (ML) NASAL
COMMUNITY
Start: 2021-08-27

## 2021-09-23 RX ORDER — ESTRADIOL 0.1 MG/G
CREAM VAGINAL
Qty: 42.5 G | Refills: 1 | Status: SHIPPED | OUTPATIENT
Start: 2021-09-23 | End: 2021-11-04

## 2021-09-29 LAB
FINAL PATHOLOGIC DIAGNOSIS: NORMAL
Lab: NORMAL

## 2021-09-30 LAB
HPV HR 12 DNA SPEC QL NAA+PROBE: NEGATIVE
HPV16 AG SPEC QL: NEGATIVE
HPV18 DNA SPEC QL NAA+PROBE: NEGATIVE

## 2022-01-02 ENCOUNTER — HOSPITAL ENCOUNTER (EMERGENCY)
Facility: HOSPITAL | Age: 62
Discharge: HOME | End: 2022-01-02
Attending: EMERGENCY MEDICINE
Payer: COMMERCIAL

## 2022-01-02 VITALS
OXYGEN SATURATION: 99 % | HEIGHT: 68 IN | BODY MASS INDEX: 31.52 KG/M2 | HEART RATE: 60 BPM | SYSTOLIC BLOOD PRESSURE: 164 MMHG | DIASTOLIC BLOOD PRESSURE: 78 MMHG | TEMPERATURE: 98 F | WEIGHT: 208 LBS | RESPIRATION RATE: 18 BRPM

## 2022-01-02 DIAGNOSIS — U07.1 COVID-19: Primary | ICD-10-CM

## 2022-01-02 DIAGNOSIS — R05.9 COUGH: ICD-10-CM

## 2022-01-02 LAB — SARS-COV-2 RDRP RESP QL NAA+PROBE: POSITIVE

## 2022-01-02 PROCEDURE — U0002 COVID-19 LAB TEST NON-CDC: HCPCS | Performed by: NURSE PRACTITIONER

## 2022-01-02 PROCEDURE — 99283 EMERGENCY DEPT VISIT LOW MDM: CPT | Mod: 25

## 2022-01-02 RX ORDER — BUDESONIDE AND FORMOTEROL FUMARATE DIHYDRATE 80; 4.5 UG/1; UG/1
2 AEROSOL RESPIRATORY (INHALATION) 2 TIMES DAILY
Qty: 6.9 G | Refills: 0 | Status: ON HOLD | OUTPATIENT
Start: 2022-01-02 | End: 2023-06-06 | Stop reason: HOSPADM

## 2022-01-02 RX ORDER — BENZONATATE 100 MG/1
100 CAPSULE ORAL 3 TIMES DAILY PRN
Qty: 20 CAPSULE | Refills: 0 | Status: SHIPPED | OUTPATIENT
Start: 2022-01-02 | End: 2022-01-12

## 2022-01-02 NOTE — Clinical Note
"Leticia Mobley" Garcia was seen and treated in our emergency department on 1/2/2022.  She may return to work on 01/07/2022.       If you have any questions or concerns, please don't hesitate to call.      Melissa Dsouza NP"

## 2022-01-02 NOTE — DISCHARGE INSTRUCTIONS
1200 mg Mucinex two times a day     If you have a COVID Test PENDING:  Please access [facility] to review the results of your test. Until the results of your COVID test return, you should isolate yourself so as not to potentially spread illness to others.   If your COVID test returns positive, you should isolate yourself so as not to spread illness to others. After five full days, if you are feeling better and you have not had fever for 24 hours, you can return to your typical daily activities, but you must wear a mask around others for an additional 5 days.   If your COVID test returns negative and you are either unvaccinated or more than six months out from your two-dose vaccine and are not yet boosted, you should still quarantine for 5 full days followed by strict mask use for an additional 5 full days.   If your COVID test returns negative and you have received your 2-dose initial vaccine as well as a booster, you should continue strict mask use for 10 full days after the exposure.  For all those exposed, best practice includes a test at day 5 after the exposure. This can be a home test or a test through one of the many testing centers throughout our community.   Masking is always advised to limit the spread of COVID. Cdc.gov is an excellent site to obtain the latest up to date recommendations regarding COVID and COVID testing.     After your evaluation today, we ruled out any emergent condition. However, if you develop shortness of breath, chest pain, or ANY OTHER CONCERNS please return to the emergency department for further care.      CDC Testing and Quarantine Guidelines for patients with exposure to a known-positive COVID-19 person:  A close exposure is defined as anyone who has had an exposure (masked or unmasked) to a known COVID -19 positive person within 6 feet of someone for a cumulative total of 15 minutes or more over a 24-hour period.   Vaccinated and/or if you recently had a positive covid test  within 90 days do NOT need to quarantine after contact with someone who had COVID-19 unless you develop symptoms.   Fully vaccinated people who have not had a positive test within 90 days, should get tested 3-5 days after their exposure, even if they don't have symptoms and wear a mask indoors in public for 14 days following exposure or until their test result is negative.      Unvaccinated and/or NOT had a positive test within 90 days and meet close exposure  You are required by CDC guidelines to quarantine for at least 5 days from time of exposure followed by 5 days of strict masking. It is recommended, but not required to test after 5 days, unless you develop symptoms, in which case you should test at that time.  If you get tested after 5 days and your test is positive, your 5 day period of isolation starts the day of the positive test.    If your exposure does not meet the above definition, you can return to your normal daily activities to include social distancing, wearing a mask and frequent handwashing.      Here is a link to guidance from the CDC:  https://www.cdc.gov/media/releases/2021/s1227-isolation-quarantine-guidance.html      [facility]  [facility] Testing Sites:  https://ldh.la.gov/page/3934      [facility] website with testing locations and guidance:  [email or web address]

## 2022-01-03 NOTE — ED PROVIDER NOTES
Encounter Date: 1/2/2022       History     Chief Complaint   Patient presents with    COVID-19 Concerns     Patient had recent COVID exposure, c/o cough, SOB, body aches      62 year old female presents to the ER with cough, subjective fever, body aches for the last 2 days. Recent sick contact just discovered they had covid. Would like covid testing. Denies SOB or CP. No rash or headache. No sore throat. No other complants.         Review of patient's allergies indicates:   Allergen Reactions    Fish containing products Hives, Itching, Rash and Swelling     Past Medical History:   Diagnosis Date    Anemia     Anxiety     Depression     Depression     Hypertension     PTSD (post-traumatic stress disorder)      Past Surgical History:   Procedure Laterality Date    APPENDECTOMY      BREAST BIOPSY N/A     3 fibroadenomas removed.     BREAST FIBROADENOMA SURGERY Bilateral 1990's    3     CHOLECYSTECTOMY      SHOULDER SURGERY Right     TONSILLECTOMY       Family History   Problem Relation Age of Onset    Hypertension Father     Heart disease Neg Hx     Heart attack Neg Hx     Breast cancer Neg Hx     Ovarian cancer Neg Hx      Social History     Tobacco Use    Smoking status: Never Smoker    Smokeless tobacco: Never Used   Substance Use Topics    Alcohol use: Yes     Alcohol/week: 3.0 standard drinks     Types: 3 Glasses of wine per week     Comment: occasionally    Drug use: Not Currently     Review of Systems   Constitutional: Positive for fatigue. Negative for chills, diaphoresis and fever.   HENT: Positive for congestion, rhinorrhea, sinus pressure, sinus pain, sneezing and sore throat. Negative for drooling, ear discharge, ear pain, facial swelling, hearing loss, mouth sores, nosebleeds, postnasal drip and trouble swallowing.    Eyes: Negative for photophobia and visual disturbance.   Respiratory: Positive for cough. Negative for chest tightness, shortness of breath, wheezing and stridor.     Cardiovascular: Negative for chest pain, palpitations and leg swelling.   Gastrointestinal: Negative for abdominal pain, constipation, diarrhea, nausea and vomiting.   Endocrine: Negative for polydipsia, polyphagia and polyuria.   Genitourinary: Negative for difficulty urinating, dysuria, flank pain and frequency.   Musculoskeletal: Negative for arthralgias, back pain, gait problem, joint swelling, myalgias, neck pain and neck stiffness.   Skin: Negative for color change, rash and wound.   Allergic/Immunologic: Negative for immunocompromised state.   Neurological: Negative for dizziness, seizures, syncope, speech difficulty, weakness, light-headedness, numbness and headaches.   Hematological: Does not bruise/bleed easily.   Psychiatric/Behavioral: Negative for agitation and confusion.       Physical Exam     Initial Vitals [01/02/22 0820]   BP Pulse Resp Temp SpO2   (!) 164/78 60 18 97.8 °F (36.6 °C) 99 %      MAP       --         Physical Exam    Nursing note and vitals reviewed.  Constitutional: She appears well-developed and well-nourished. She is not diaphoretic. No distress.   HENT:   Head: Normocephalic and atraumatic.   Right Ear: External ear normal.   Left Ear: External ear normal.   Nose: Nose normal.   Mouth/Throat: Oropharynx is clear and moist. No oropharyngeal exudate.   Eyes: Conjunctivae are normal. Pupils are equal, round, and reactive to light.   Neck: Neck supple.   Normal range of motion.  Cardiovascular: Normal rate.   No murmur heard.  Pulmonary/Chest: Breath sounds normal. No respiratory distress. She has no wheezes. She has no rhonchi. She has no rales. She exhibits no tenderness.   Abdominal: Abdomen is soft. Bowel sounds are normal. There is no abdominal tenderness.   Musculoskeletal:      Cervical back: Normal range of motion and neck supple.     Neurological: She is alert and oriented to person, place, and time. She has normal strength.   Skin: Skin is warm and dry. Capillary refill  takes less than 2 seconds. No rash noted. No erythema.   Psychiatric: She has a normal mood and affect. Thought content normal.         ED Course   Procedures  Labs Reviewed   SARS-COV-2 RNA AMPLIFICATION, QUAL - Abnormal; Notable for the following components:       Result Value    SARS-CoV-2 RNA, Amplification, Qual Positive (*)     All other components within normal limits          Imaging Results          X-Ray Chest AP Portable (Final result)  Result time 01/02/22 09:13:17    Final result by Kortney Dougherty MD (01/02/22 09:13:17)                 Narrative:    Two views of the chest    Clinical history is cough    Lungs are hyperexpanded. There are no infiltrates or pleural effusions. The cardiomediastinal silhouette is normal in size. There are degenerative changes of the spine.    IMPRESSION: Hyperexpanded lungs suggestive of COPD    No confluent infiltrates    Electronically signed by:  Kortney Dougherty MD  1/2/2022 9:13 AM CST Workstation: [id number]                               Medications - No data to display                       Clinical Impression:   Final diagnoses:  [R05.9] Cough  [U07.1] COVID-19 (Primary)          ED Disposition Condition    Discharge Stable        ED Prescriptions     Medication Sig Dispense Start Date End Date Auth. Provider    budesonide-formoterol 80-4.5 mcg (SYMBICORT) 80-4.5 mcg/actuation HFAA Inhale 2 puffs into the lungs 2 (two) times a day. Controller 6.9 g 1/2/2022 1/2/2023 Melissa Dsouza NP    benzonatate (TESSALON) 100 MG capsule Take 1 capsule (100 mg total) by mouth 3 (three) times daily as needed for Cough. 20 capsule 1/2/2022 1/12/2022 Melissa Dsouza NP        Follow-up Information    None          Melissa Dsouza NP  01/03/22 7726

## 2022-01-04 ENCOUNTER — PATIENT MESSAGE (OUTPATIENT)
Dept: ADMINISTRATIVE | Facility: OTHER | Age: 62
End: 2022-01-04
Payer: COMMERCIAL

## 2022-01-04 ENCOUNTER — PATIENT MESSAGE (OUTPATIENT)
Dept: FAMILY MEDICINE | Facility: CLINIC | Age: 62
End: 2022-01-04
Payer: COMMERCIAL

## 2022-01-05 ENCOUNTER — INFUSION (OUTPATIENT)
Dept: INFECTIOUS DISEASES | Facility: HOSPITAL | Age: 62
End: 2022-01-05
Attending: NURSE PRACTITIONER
Payer: COMMERCIAL

## 2022-01-05 VITALS
OXYGEN SATURATION: 96 % | HEART RATE: 63 BPM | TEMPERATURE: 97 F | DIASTOLIC BLOOD PRESSURE: 80 MMHG | RESPIRATION RATE: 18 BRPM | SYSTOLIC BLOOD PRESSURE: 136 MMHG

## 2022-01-05 DIAGNOSIS — U07.1 COVID-19: ICD-10-CM

## 2022-01-05 PROCEDURE — 63600175 PHARM REV CODE 636 W HCPCS: Performed by: NURSE PRACTITIONER

## 2022-01-05 PROCEDURE — 25000003 PHARM REV CODE 250: Performed by: NURSE PRACTITIONER

## 2022-01-05 PROCEDURE — M0243 CASIRIVI AND IMDEVI INFUSION: HCPCS | Performed by: NURSE PRACTITIONER

## 2022-01-05 RX ORDER — ALBUTEROL SULFATE 90 UG/1
2 AEROSOL, METERED RESPIRATORY (INHALATION)
Status: DISCONTINUED | OUTPATIENT
Start: 2022-01-05 | End: 2022-03-04

## 2022-01-05 RX ORDER — ONDANSETRON 2 MG/ML
4 INJECTION INTRAMUSCULAR; INTRAVENOUS ONCE AS NEEDED
Status: DISCONTINUED | OUTPATIENT
Start: 2022-01-05 | End: 2022-03-04

## 2022-01-05 RX ORDER — EPINEPHRINE 0.3 MG/.3ML
0.3 INJECTION SUBCUTANEOUS
Status: DISCONTINUED | OUTPATIENT
Start: 2022-01-05 | End: 2022-03-04

## 2022-01-05 RX ORDER — SODIUM CHLORIDE 0.9 % (FLUSH) 0.9 %
10 SYRINGE (ML) INJECTION
Status: DISCONTINUED | OUTPATIENT
Start: 2022-01-05 | End: 2022-03-04

## 2022-01-05 RX ORDER — DIPHENHYDRAMINE HYDROCHLORIDE 50 MG/ML
25 INJECTION INTRAMUSCULAR; INTRAVENOUS ONCE AS NEEDED
Status: DISCONTINUED | OUTPATIENT
Start: 2022-01-05 | End: 2022-03-04

## 2022-01-05 RX ORDER — ACETAMINOPHEN 325 MG/1
650 TABLET ORAL ONCE AS NEEDED
Status: DISCONTINUED | OUTPATIENT
Start: 2022-01-05 | End: 2022-03-04

## 2022-01-05 RX ADMIN — CASIRIVIMAB AND IMDEVIMAB 600 MG: 600; 600 INJECTION, SOLUTION, CONCENTRATE INTRAVENOUS at 01:01

## 2022-01-05 RX ADMIN — SODIUM CHLORIDE: 0.9 INJECTION, SOLUTION INTRAVENOUS at 01:01

## 2022-01-05 NOTE — PATIENT INSTRUCTIONS
Continue to isolate for a full 5 days from either the start of symptoms or positive Covid test. You also will need to be free of fever for 24 hours before getting around other people. Please continue to wear a mask for an additional 5 days when around others.     If you develop any unsual symptoms after leaving infusion or feel like you are getting worse, please call the [facility] On-Call RN at [phone].    If you DID NOT receive a vaccine, do not get one within the next 90 days, per CDC guidelines, this includes any booster vaccines as well.    Continue to monitor and treat any fevers or congestion. Symptoms should begin to improve over the next 24-48 hours. Hydrate well with non-caffeinated beverages, eat every 3-4 hours, and move as much as you can.    In addition to your AVS, you were provided with medication fact sheets regarding Regeneron (casirivimab-imdevimab) which is the medication you received today.

## 2022-01-05 NOTE — PLAN OF CARE
Patient tolerated treatment without any reactions. Patient observed for 1 hour following infusion completion. IV removed. AVS and medication information provided. Patient ambulated upon discharge per self.

## 2022-01-24 ENCOUNTER — PATIENT MESSAGE (OUTPATIENT)
Dept: FAMILY MEDICINE | Facility: CLINIC | Age: 62
End: 2022-01-24
Payer: COMMERCIAL

## 2022-01-24 DIAGNOSIS — Z12.31 ENCOUNTER FOR SCREENING MAMMOGRAM FOR MALIGNANT NEOPLASM OF BREAST: Primary | ICD-10-CM

## 2022-02-16 ENCOUNTER — HOSPITAL ENCOUNTER (OUTPATIENT)
Dept: RADIOLOGY | Facility: CLINIC | Age: 62
Discharge: HOME | End: 2022-02-16
Attending: STUDENT IN AN ORGANIZED HEALTH CARE EDUCATION/TRAINING PROGRAM
Payer: COMMERCIAL

## 2022-02-16 DIAGNOSIS — Z12.31 ENCOUNTER FOR SCREENING MAMMOGRAM FOR MALIGNANT NEOPLASM OF BREAST: ICD-10-CM

## 2022-02-16 PROCEDURE — 77063 MAMMO DIGITAL SCREENING BILAT WITH TOMO: ICD-10-PCS | Mod: 26,,, | Performed by: RADIOLOGY

## 2022-02-16 PROCEDURE — 77067 SCR MAMMO BI INCL CAD: CPT | Mod: TC,PO

## 2022-02-16 PROCEDURE — 77063 BREAST TOMOSYNTHESIS BI: CPT | Mod: TC,PO

## 2022-02-16 PROCEDURE — 77067 MAMMO DIGITAL SCREENING BILAT WITH TOMO: ICD-10-PCS | Mod: 26,,, | Performed by: RADIOLOGY

## 2022-02-16 PROCEDURE — 77063 BREAST TOMOSYNTHESIS BI: CPT | Mod: 26,,, | Performed by: RADIOLOGY

## 2022-02-16 PROCEDURE — 77067 SCR MAMMO BI INCL CAD: CPT | Mod: 26,,, | Performed by: RADIOLOGY

## 2022-03-04 ENCOUNTER — OFFICE VISIT (OUTPATIENT)
Dept: FAMILY MEDICINE | Facility: CLINIC | Age: 62
End: 2022-03-04
Payer: COMMERCIAL

## 2022-03-04 VITALS
OXYGEN SATURATION: 96 % | DIASTOLIC BLOOD PRESSURE: 72 MMHG | SYSTOLIC BLOOD PRESSURE: 100 MMHG | HEIGHT: 68 IN | HEART RATE: 85 BPM | BODY MASS INDEX: 31.57 KG/M2 | RESPIRATION RATE: 18 BRPM | WEIGHT: 208.31 LBS

## 2022-03-04 DIAGNOSIS — F33.2 SEVERE EPISODE OF RECURRENT MAJOR DEPRESSIVE DISORDER, WITHOUT PSYCHOTIC FEATURES: ICD-10-CM

## 2022-03-04 DIAGNOSIS — R03.0 ELEVATED BLOOD PRESSURE READING: ICD-10-CM

## 2022-03-04 DIAGNOSIS — F41.1 GENERALIZED ANXIETY DISORDER: ICD-10-CM

## 2022-03-04 DIAGNOSIS — R05.8 POST-VIRAL COUGH SYNDROME: Primary | ICD-10-CM

## 2022-03-04 PROCEDURE — 3078F DIAST BP <80 MM HG: CPT | Mod: CPTII,S$GLB,, | Performed by: STUDENT IN AN ORGANIZED HEALTH CARE EDUCATION/TRAINING PROGRAM

## 2022-03-04 PROCEDURE — 4010F PR ACE/ARB THEARPY RXD/TAKEN: ICD-10-PCS | Mod: CPTII,S$GLB,, | Performed by: STUDENT IN AN ORGANIZED HEALTH CARE EDUCATION/TRAINING PROGRAM

## 2022-03-04 PROCEDURE — 99214 OFFICE O/P EST MOD 30 MIN: CPT | Mod: S$GLB,,, | Performed by: STUDENT IN AN ORGANIZED HEALTH CARE EDUCATION/TRAINING PROGRAM

## 2022-03-04 PROCEDURE — 1159F PR MEDICATION LIST DOCUMENTED IN MEDICAL RECORD: ICD-10-PCS | Mod: CPTII,S$GLB,, | Performed by: STUDENT IN AN ORGANIZED HEALTH CARE EDUCATION/TRAINING PROGRAM

## 2022-03-04 PROCEDURE — 99999 PR PBB SHADOW E&M-EST. PATIENT-LVL V: ICD-10-PCS | Mod: PBBFAC,,, | Performed by: STUDENT IN AN ORGANIZED HEALTH CARE EDUCATION/TRAINING PROGRAM

## 2022-03-04 PROCEDURE — 3008F BODY MASS INDEX DOCD: CPT | Mod: CPTII,S$GLB,, | Performed by: STUDENT IN AN ORGANIZED HEALTH CARE EDUCATION/TRAINING PROGRAM

## 2022-03-04 PROCEDURE — 3074F SYST BP LT 130 MM HG: CPT | Mod: CPTII,S$GLB,, | Performed by: STUDENT IN AN ORGANIZED HEALTH CARE EDUCATION/TRAINING PROGRAM

## 2022-03-04 PROCEDURE — 1159F MED LIST DOCD IN RCRD: CPT | Mod: CPTII,S$GLB,, | Performed by: STUDENT IN AN ORGANIZED HEALTH CARE EDUCATION/TRAINING PROGRAM

## 2022-03-04 PROCEDURE — 1160F RVW MEDS BY RX/DR IN RCRD: CPT | Mod: CPTII,S$GLB,, | Performed by: STUDENT IN AN ORGANIZED HEALTH CARE EDUCATION/TRAINING PROGRAM

## 2022-03-04 PROCEDURE — 3008F PR BODY MASS INDEX (BMI) DOCUMENTED: ICD-10-PCS | Mod: CPTII,S$GLB,, | Performed by: STUDENT IN AN ORGANIZED HEALTH CARE EDUCATION/TRAINING PROGRAM

## 2022-03-04 PROCEDURE — 1160F PR REVIEW ALL MEDS BY PRESCRIBER/CLIN PHARMACIST DOCUMENTED: ICD-10-PCS | Mod: CPTII,S$GLB,, | Performed by: STUDENT IN AN ORGANIZED HEALTH CARE EDUCATION/TRAINING PROGRAM

## 2022-03-04 PROCEDURE — 99999 PR PBB SHADOW E&M-EST. PATIENT-LVL V: CPT | Mod: PBBFAC,,, | Performed by: STUDENT IN AN ORGANIZED HEALTH CARE EDUCATION/TRAINING PROGRAM

## 2022-03-04 PROCEDURE — 3078F PR MOST RECENT DIASTOLIC BLOOD PRESSURE < 80 MM HG: ICD-10-PCS | Mod: CPTII,S$GLB,, | Performed by: STUDENT IN AN ORGANIZED HEALTH CARE EDUCATION/TRAINING PROGRAM

## 2022-03-04 PROCEDURE — 99214 PR OFFICE/OUTPT VISIT, EST, LEVL IV, 30-39 MIN: ICD-10-PCS | Mod: S$GLB,,, | Performed by: STUDENT IN AN ORGANIZED HEALTH CARE EDUCATION/TRAINING PROGRAM

## 2022-03-04 PROCEDURE — 3074F PR MOST RECENT SYSTOLIC BLOOD PRESSURE < 130 MM HG: ICD-10-PCS | Mod: CPTII,S$GLB,, | Performed by: STUDENT IN AN ORGANIZED HEALTH CARE EDUCATION/TRAINING PROGRAM

## 2022-03-04 PROCEDURE — 4010F ACE/ARB THERAPY RXD/TAKEN: CPT | Mod: CPTII,S$GLB,, | Performed by: STUDENT IN AN ORGANIZED HEALTH CARE EDUCATION/TRAINING PROGRAM

## 2022-03-04 RX ORDER — PROMETHAZINE HYDROCHLORIDE AND DEXTROMETHORPHAN HYDROBROMIDE 6.25; 15 MG/5ML; MG/5ML
5 SYRUP ORAL NIGHTLY PRN
Qty: 118 ML | Refills: 0 | Status: SHIPPED | OUTPATIENT
Start: 2022-03-04 | End: 2022-03-14

## 2022-03-04 RX ORDER — ENALAPRIL MALEATE 5 MG/1
2.5 TABLET ORAL DAILY
COMMUNITY
End: 2022-12-29

## 2022-03-04 RX ORDER — BUDESONIDE 0.5 MG/2ML
0.5 INHALANT ORAL 2 TIMES DAILY
Status: ON HOLD | COMMUNITY
End: 2023-06-06 | Stop reason: HOSPADM

## 2022-03-04 NOTE — PATIENT INSTRUCTIONS
Take BP 3 times weekly.     Send me a list in 3 weeks.           Flonase 2 sprays to both nostrils daily for 2 weeks    Claritin/Zyrtec/Allegra daily for 2 weeks then continue if symptoms well controlled    Enio Med Sinus Rinse  DISTILLED WATER ONLY  Add pre-packaged salt to solution (DON'T USE TABLE SALT)  Bend head forward and press nostril against top of bottle. Gently squeeze bottle and water should start coming out of your opposite nostril. Some may come out of your mouth, that is okay but don't swallow the water if possible. Continue until bottle is about 1/4-1/3 gone then switch sides and repeat  GENTLY blow nose without pinching. Sniff in any remaining solution to coat the back parts of the nose          https://www.[facility].nhs.uk/managing-the-effects/effects-on-your-body/cough/#:~:text=Whilst%20recovering%20from%20COVID%20you,help%20to%20prevent%20this%20cough.  If you are due for any health screening(s) below please notify me so we can arrange them to be ordered and scheduled to maintain your health.         Health Maintenance   Topic Date Due    Hepatitis C Screening  Never done    TETANUS VACCINE  Never done    Mammogram  02/16/2023    Lipid Panel  11/25/2025

## 2022-03-04 NOTE — PROGRESS NOTES
[facility] MEDICINE CLINIC NOTE    Patient Name: Leticia Zelaya  YOB: 1960    PRESENTING HISTORY   Chief Complaint:   Chief Complaint   Patient presents with    Cough        History of Present Illness:  Ms. Leticia Zelaya is a 62 y.o. female     Here with a few issues she wants to discuss    1.   2 months cough  Worse with damp, worse with going outside.  Worse at night, but also coughs during day some times.   Described as seal barking.     No sig sputum production.   Mild runny nose.   Clears throat frequently.     Post COVID- has been coughing ever since.     Had CXR during acute presentation. Upper lobes darkened, radiology read COPD.     2.   Blood pressures occasionally go into the 140s systolic. Usually they are lower than this. She has PRN medication which she takes in addition to her enalapril. I reviewed her log. Recommended less monitoring as I feel this is a source of stress for her. Stop PRN medication and titrate based on average BP.       Incidentally noted:  Chronic anxiety/depression- feels overwhelmed. 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    Dr. Mayer is psychiatrist, saw 1 week ago. Told to continue Trintellix. Gabapentin, prazosin QHS. Referred for vitamin infusion to improve her symptoms.    Not actively suicidal.    My medical assistant performed PHQ9 scoring- 22 points.                                            Review of Systems   Constitutional: Positive for fever. Negative for chills and weight loss.   HENT: Positive for ear pain. Negative for sore throat.    Respiratory: Positive for cough. Negative for hemoptysis, shortness of breath and wheezing.    Cardiovascular: Negative for chest pain.   Gastrointestinal: Negative for heartburn.   Musculoskeletal: Negative for myalgias.   Skin: Negative for rash.   Neurological: Positive for headaches.   Endo/Heme/Allergies: Negative for environmental allergies.         PAST HISTORY:      Past Medical History:   Diagnosis Date    Anemia     Anxiety     Depression     Depression     Hypertension     PTSD (post-traumatic stress disorder)        Past Surgical History:   Procedure Laterality Date    APPENDECTOMY      BREAST BIOPSY N/A     3 fibroadenomas removed.     BREAST FIBROADENOMA SURGERY Bilateral 1990's    3     CHOLECYSTECTOMY      SHOULDER SURGERY Right     TONSILLECTOMY         Family History   Problem Relation Age of Onset    Hypertension Father     Heart disease Neg Hx     Heart attack Neg Hx     Breast cancer Neg Hx     Ovarian cancer Neg Hx        Social History     Socioeconomic History    Marital status:    Tobacco Use    Smoking status: Never Smoker    Smokeless tobacco: Never Used   Substance and Sexual Activity    Alcohol use: Yes     Alcohol/week: 3.0 standard drinks     Types: 3 Glasses of wine per week     Comment: occasionally    Drug use: Not Currently    Sexual activity: Not Currently     Partners: Male     Social Determinants of Health     Financial Resource Strain: Low Risk     Difficulty of Paying Living Expenses: Not hard at all   Food Insecurity: No Food Insecurity    Worried About Running Out of Food in the Last Year: Never true    Ran Out of Food in the Last Year: Never true   Transportation Needs: Unknown    Lack of Transportation (Medical): No   Physical Activity: Insufficiently Active    Days of Exercise per Week: 1 day    Minutes of Exercise per Session: 20 min   Stress: Stress Concern Present    Feeling of Stress : Very much   Social Connections: Unknown    Frequency of Communication with Friends and Family: Twice a week    Frequency of Social Gatherings with Friends and Family: Once a week    Active Member of Clubs or Organizations: No    Attends Club or Organization Meetings: Patient refused    Marital Status:    Housing Stability: Low Risk     Unable to Pay for Housing in the Last Year: No    Number of Places  Lived in the Last Year: 1    Unstable Housing in the Last Year: No       MEDICATIONS & ALLERGIES:     Current Outpatient Medications on File Prior to Visit   Medication Sig    ALPRAZolam (XANAX) 0.25 MG tablet TK 1/2 TO 1 T PO UP TO BID PRN    budesonide (PULMICORT) 0.5 mg/2 mL nebulizer solution Take 0.5 mg by nebulization 2 (two) times daily. Controller    cyclobenzaprine (FLEXERIL) 5 MG tablet TAKE 1 TABLET(5 MG) BY MOUTH THREE TIMES DAILY AS NEEDED FOR MUSCLE SPASMS    enalapril (VASOTEC) 5 MG tablet Take 2.5 mg by mouth once daily.    fluticasone propionate (FLONASE) 50 mcg/actuation nasal spray by Each Nostril route.    gabapentin (NEURONTIN) 100 MG capsule Take 100 mg by mouth 3 (three) times daily.    NURTEC 75 mg odt PLACE 1 T ON THE TONGUE AND ALLOW TO DISSOLVE ONCE D PRN    omega-3 acid ethyl esters (LOVAZA) 1 gram capsule TK 2 CS PO QD    rizatriptan (MAXALT-MLT) 10 MG disintegrating tablet PLACE 1 T ON THE TONGUE AND ALLOW TO DISSOLVE ONE TIME MAY REPEAT ONCE IN  2 H X2. MAX DAILY DOSE 30MG/24H    SUMAtriptan (IMITREX) 20 mg/actuation nasal spray     triamterene-hydrochlorothiazide 37.5-25 mg (MAXZIDE-25) 37.5-25 mg per tablet Take 1 tablet by mouth once daily.     TRINTELLIX 20 mg Tab Take 1 tablet by mouth once daily.    ZINC PICOLINATE ORAL Take by mouth. Take 1 tab each morning.    budesonide-formoterol 80-4.5 mcg (SYMBICORT) 80-4.5 mcg/actuation HFAA Inhale 2 puffs into the lungs 2 (two) times a day. Controller (Patient not taking: Reported on 3/4/2022)    clonazePAM (KLONOPIN) 0.5 MG tablet SMARTSI.5-1 Tablet(s) By Mouth 1 to 2 Times Daily PRN    estradioL (ESTRACE) 0.01 % (0.1 mg/gram) vaginal cream Place 2 g vaginally every evening for 14 days, THEN 1 g every evening for 14 days, THEN 1 g twice a week for 14 days.    prazosin (MINIPRESS) 2 MG Cap Take 2-4 mg by mouth nightly.    ramelteon (ROZEREM) 8 mg tablet TK 1 T PO QHS PRN    suvorexant (BELSOMRA) 10 mg Tab   "    Current Facility-Administered Medications on File Prior to Visit   Medication    acetaminophen tablet 650 mg    albuterol inhaler 2 puff    diphenhydrAMINE injection 25 mg    EPINEPHrine (EPIPEN) 0.3 mg/0.3 mL pen injection 0.3 mg    methylPREDNISolone sodium succinate injection 40 mg    ondansetron injection 4 mg    sodium chloride 0.9% 500 mL flush bag    sodium chloride 0.9% flush 10 mL       Review of patient's allergies indicates:   Allergen Reactions    Fish containing products Hives, Itching, Rash and Swelling       OBJECTIVE:   Vital Signs:  Vitals:    03/04/22 0817   BP: 100/72   Pulse: 85   Resp: 18   SpO2: 96%   Weight: 94.5 kg (208 lb 5.4 oz)   Height: 5' 8" (1.727 m)       No results found for this or any previous visit (from the past 24 hour(s)).      Physical Exam  Vitals and nursing note reviewed.   Constitutional:       General: She is not in acute distress.     Appearance: She is not toxic-appearing or diaphoretic.   HENT:      Head: Normocephalic and atraumatic.      Right Ear: External ear normal.      Left Ear: External ear normal.   Eyes:      General: No scleral icterus.     Conjunctiva/sclera: Conjunctivae normal.      Pupils: Pupils are equal, round, and reactive to light.   Neck:      Thyroid: No thyromegaly.      Vascular: No carotid bruit.   Cardiovascular:      Rate and Rhythm: Normal rate and regular rhythm.      Heart sounds: Normal heart sounds. No murmur heard.  Pulmonary:      Effort: Pulmonary effort is normal. No respiratory distress.      Breath sounds: Normal breath sounds. No wheezing or rales.   Musculoskeletal:         General: No tenderness or deformity. Normal range of motion.      Cervical back: Normal range of motion and neck supple.      Right lower leg: No edema.      Left lower leg: No edema.   Lymphadenopathy:      Cervical: No cervical adenopathy.   Skin:     General: Skin is warm and dry.      Findings: No erythema or rash.   Neurological:      Mental " Status: She is alert and oriented to person, place, and time.      Gait: Gait is intact.   Psychiatric:         Mood and Affect: Affect normal.         Cognition and Memory: Memory normal.         ASSESSMENT & PLAN:     Post-viral cough syndrome  -     Complete PFT w/ bronchodilator; Future  -     promethazine-dextromethorphan (PROMETHAZINE-DM) 6.25-15 mg/5 mL Syrp; Take 5 mLs by mouth nightly as needed (Cough).  Dispense: 118 mL; Refill: 0    Elevated blood pressure reading  Recommended less monitoring as I feel this is a source of stress for her. Stop PRN medication and titrate based on average BP.     Severe episode of recurrent major depressive disorder, without psychotic features  Defer management to her psychiatrist.     Generalized anxiety disorder  Defer management to her psychiatrist.      Josh Aj MD   Internal Medicine    This note was created using Dragon voice recognition software that occasionally misinterprets phrases or words.          Answers for HPI/ROS submitted by the patient on 3/4/2022  Chronicity: chronic  Onset: more than 1 month ago  Progression since onset: waxing and waning  Frequency: every few hours  Cough characteristics: non-productive  ear congestion: No  nasal congestion: Yes  postnasal drip: Yes  rhinorrhea: Yes  sweats: No  Aggravated by: cold air, dust  asthma: No  bronchiectasis: No  bronchitis: No  COPD: No  emphysema: No  pneumonia: No  Treatments tried: a beta-agonist inhaler, prescription cough suppressant  Improvement on treatment: moderate

## 2022-03-09 ENCOUNTER — HOSPITAL ENCOUNTER (OUTPATIENT)
Dept: PULMONOLOGY | Facility: HOSPITAL | Age: 62
Discharge: HOME | End: 2022-03-09
Attending: STUDENT IN AN ORGANIZED HEALTH CARE EDUCATION/TRAINING PROGRAM
Payer: COMMERCIAL

## 2022-03-09 DIAGNOSIS — R05.8 POST-VIRAL COUGH SYNDROME: ICD-10-CM

## 2022-03-09 PROCEDURE — 94729 DIFFUSING CAPACITY: CPT

## 2022-03-09 PROCEDURE — 94060 EVALUATION OF WHEEZING: CPT

## 2022-03-09 PROCEDURE — 94729 DIFFUSING CAPACITY: CPT | Mod: 26,,, | Performed by: INTERNAL MEDICINE

## 2022-03-09 PROCEDURE — 94060 PR EVAL OF BRONCHOSPASM: ICD-10-PCS | Mod: 26,,, | Performed by: INTERNAL MEDICINE

## 2022-03-09 PROCEDURE — 94060 EVALUATION OF WHEEZING: CPT | Mod: 26,,, | Performed by: INTERNAL MEDICINE

## 2022-03-09 PROCEDURE — 94729 PR C02/MEMBANE DIFFUSE CAPACITY: ICD-10-PCS | Mod: 26,,, | Performed by: INTERNAL MEDICINE

## 2022-03-09 PROCEDURE — 94727 PR PULM FUNCTION TEST BY GAS: ICD-10-PCS | Mod: 26,,, | Performed by: INTERNAL MEDICINE

## 2022-03-09 PROCEDURE — 94727 GAS DIL/WSHOT DETER LNG VOL: CPT

## 2022-03-09 PROCEDURE — 94727 GAS DIL/WSHOT DETER LNG VOL: CPT | Mod: 26,,, | Performed by: INTERNAL MEDICINE

## 2022-03-15 LAB
BRPFT: NORMAL
DLCO ADJ PRE: 20.42 ML/(MIN*MMHG)
DLCO SINGLE BREATH LLN: 18.98
DLCO SINGLE BREATH PRE REF: 82.6 %
DLCO SINGLE BREATH REF: 24.71
DLCOC SBVA LLN: 3.11
DLCOC SBVA PRE REF: 96.5 %
DLCOC SBVA REF: 4.39
DLCOC SINGLE BREATH LLN: 18.98
DLCOC SINGLE BREATH PRE REF: 82.6 %
DLCOC SINGLE BREATH REF: 24.71
DLCOVA LLN: 3.11
DLCOVA PRE REF: 96.5 %
DLCOVA PRE: 4.24 ML/(MIN*MMHG*L)
DLCOVA REF: 4.39
DLVAADJ PRE: 4.24 ML/(MIN*MMHG*L)
ERVN2 LLN: -16449.22
ERVN2 PRE REF: 44.1 %
ERVN2 PRE: 0.35 L
ERVN2 REF: 0.78
FEF 25 75 CHG: 46.9 %
FEF 25 75 LLN: 1.12
FEF 25 75 POST REF: 115.1 %
FEF 25 75 PRE REF: 78.3 %
FEF 25 75 REF: 2.29
FET100 CHG: 5.3 %
FEV1 CHG: 10.9 %
FEV1 FVC CHG: 7.5 %
FEV1 FVC LLN: 66
FEV1 FVC POST REF: 103.2 %
FEV1 FVC PRE REF: 96 %
FEV1 FVC REF: 78
FEV1 LLN: 2.02
FEV1 POST REF: 93.3 %
FEV1 PRE REF: 84.2 %
FEV1 REF: 2.71
FRCN2 LLN: 2.12
FRCN2 PRE REF: 81.8 %
FRCN2 REF: 2.94
FVC CHG: 3.2 %
FVC LLN: 2.61
FVC POST REF: 89.7 %
FVC PRE REF: 86.9 %
FVC REF: 3.5
IVC PRE: 2.87 L
IVC SINGLE BREATH LLN: 2.61
IVC SINGLE BREATH PRE REF: 82.1 %
IVC SINGLE BREATH REF: 3.5
MVV LLN: 90
MVV PRE REF: 91.1 %
MVV REF: 105
PEF CHG: -1.4 %
PEF LLN: 4.77
PEF POST REF: 104.8 %
PEF PRE REF: 106.3 %
PEF REF: 6.71
POST FEF 25 75: 2.63 L/S
POST FET 100: 7.62 SEC
POST FEV1 FVC: 80.8 %
POST FEV1: 2.53 L
POST FVC: 3.13 L
POST PEF: 7.04 L/S
PRE DLCO: 20.42 ML/(MIN*MMHG)
PRE FEF 25 75: 1.79 L/S
PRE FET 100: 7.23 SEC
PRE FEV1 FVC: 75.18 %
PRE FEV1: 2.28 L
PRE FRC N2: 2.4 L
PRE FVC: 3.04 L
PRE MVV: 96 L/MIN
PRE PEF: 7.13 L/S
RVN2 LLN: 1.58
RVN2 PRE REF: 95.1 %
RVN2 PRE: 2.05 L
RVN2 REF: 2.16
RVN2TLCN2 LLN: 31.13
RVN2TLCN2 PRE REF: 97.2 %
RVN2TLCN2 PRE: 39.58 %
RVN2TLCN2 REF: 40.72
TLCN2 LLN: 4.64
TLCN2 PRE REF: 92 %
TLCN2 PRE: 5.18 L
TLCN2 REF: 5.63
VA PRE: 4.82 L
VA SINGLE BREATH LLN: 5.48
VA SINGLE BREATH PRE REF: 88 %
VA SINGLE BREATH REF: 5.48
VCMAXN2 LLN: 2.61
VCMAXN2 PRE REF: 89.5 %
VCMAXN2 PRE: 3.13 L
VCMAXN2 REF: 3.5

## 2022-03-22 ENCOUNTER — PATIENT MESSAGE (OUTPATIENT)
Dept: FAMILY MEDICINE | Facility: CLINIC | Age: 62
End: 2022-03-22
Payer: COMMERCIAL

## 2022-04-13 ENCOUNTER — IMMUNIZATION (OUTPATIENT)
Dept: PRIMARY CARE CLINIC | Facility: CLINIC | Age: 62
End: 2022-04-13
Payer: COMMERCIAL

## 2022-04-13 DIAGNOSIS — Z23 NEED FOR VACCINATION: Primary | ICD-10-CM

## 2022-04-13 PROCEDURE — 91306 COVID-19, MRNA, LNP-S, PF, 100 MCG/0.25 ML DOSE VACCINE (MODERNA BOOSTER): CPT | Mod: S$GLB,,, | Performed by: FAMILY MEDICINE

## 2022-04-13 PROCEDURE — 0064A COVID-19, MRNA, LNP-S, PF, 100 MCG/0.25 ML DOSE VACCINE (MODERNA BOOSTER): CPT | Mod: S$GLB,,, | Performed by: FAMILY MEDICINE

## 2022-04-13 PROCEDURE — 91306 COVID-19, MRNA, LNP-S, PF, 100 MCG/0.25 ML DOSE VACCINE (MODERNA BOOSTER): ICD-10-PCS | Mod: S$GLB,,, | Performed by: FAMILY MEDICINE

## 2022-04-13 PROCEDURE — 0064A COVID-19, MRNA, LNP-S, PF, 100 MCG/0.25 ML DOSE VACCINE (MODERNA BOOSTER): ICD-10-PCS | Mod: S$GLB,,, | Performed by: FAMILY MEDICINE

## 2022-07-28 ENCOUNTER — HOSPITAL ENCOUNTER (OUTPATIENT)
Dept: RADIOLOGY | Facility: CLINIC | Age: 62
Discharge: HOME | End: 2022-07-28
Attending: STUDENT IN AN ORGANIZED HEALTH CARE EDUCATION/TRAINING PROGRAM
Payer: COMMERCIAL

## 2022-07-28 ENCOUNTER — OFFICE VISIT (OUTPATIENT)
Dept: FAMILY MEDICINE | Facility: CLINIC | Age: 62
End: 2022-07-28
Payer: COMMERCIAL

## 2022-07-28 VITALS
OXYGEN SATURATION: 96 % | RESPIRATION RATE: 18 BRPM | DIASTOLIC BLOOD PRESSURE: 66 MMHG | HEIGHT: 68 IN | WEIGHT: 199.75 LBS | BODY MASS INDEX: 30.27 KG/M2 | HEART RATE: 92 BPM | SYSTOLIC BLOOD PRESSURE: 112 MMHG

## 2022-07-28 DIAGNOSIS — R11.2 NAUSEA AND VOMITING, INTRACTABILITY OF VOMITING NOT SPECIFIED, UNSPECIFIED VOMITING TYPE: ICD-10-CM

## 2022-07-28 DIAGNOSIS — R19.7 DIARRHEA, UNSPECIFIED TYPE: Primary | ICD-10-CM

## 2022-07-28 PROCEDURE — 3078F PR MOST RECENT DIASTOLIC BLOOD PRESSURE < 80 MM HG: ICD-10-PCS | Mod: CPTII,S$GLB,, | Performed by: STUDENT IN AN ORGANIZED HEALTH CARE EDUCATION/TRAINING PROGRAM

## 2022-07-28 PROCEDURE — 3074F SYST BP LT 130 MM HG: CPT | Mod: CPTII,S$GLB,, | Performed by: STUDENT IN AN ORGANIZED HEALTH CARE EDUCATION/TRAINING PROGRAM

## 2022-07-28 PROCEDURE — 74019 RADEX ABDOMEN 2 VIEWS: CPT | Mod: TC,FY,PO

## 2022-07-28 PROCEDURE — 1159F PR MEDICATION LIST DOCUMENTED IN MEDICAL RECORD: ICD-10-PCS | Mod: CPTII,S$GLB,, | Performed by: STUDENT IN AN ORGANIZED HEALTH CARE EDUCATION/TRAINING PROGRAM

## 2022-07-28 PROCEDURE — 3078F DIAST BP <80 MM HG: CPT | Mod: CPTII,S$GLB,, | Performed by: STUDENT IN AN ORGANIZED HEALTH CARE EDUCATION/TRAINING PROGRAM

## 2022-07-28 PROCEDURE — 3008F PR BODY MASS INDEX (BMI) DOCUMENTED: ICD-10-PCS | Mod: CPTII,S$GLB,, | Performed by: STUDENT IN AN ORGANIZED HEALTH CARE EDUCATION/TRAINING PROGRAM

## 2022-07-28 PROCEDURE — 4010F ACE/ARB THERAPY RXD/TAKEN: CPT | Mod: CPTII,S$GLB,, | Performed by: STUDENT IN AN ORGANIZED HEALTH CARE EDUCATION/TRAINING PROGRAM

## 2022-07-28 PROCEDURE — 99999 PR PBB SHADOW E&M-EST. PATIENT-LVL V: CPT | Mod: PBBFAC,,, | Performed by: STUDENT IN AN ORGANIZED HEALTH CARE EDUCATION/TRAINING PROGRAM

## 2022-07-28 PROCEDURE — 3008F BODY MASS INDEX DOCD: CPT | Mod: CPTII,S$GLB,, | Performed by: STUDENT IN AN ORGANIZED HEALTH CARE EDUCATION/TRAINING PROGRAM

## 2022-07-28 PROCEDURE — 4010F PR ACE/ARB THEARPY RXD/TAKEN: ICD-10-PCS | Mod: CPTII,S$GLB,, | Performed by: STUDENT IN AN ORGANIZED HEALTH CARE EDUCATION/TRAINING PROGRAM

## 2022-07-28 PROCEDURE — 3074F PR MOST RECENT SYSTOLIC BLOOD PRESSURE < 130 MM HG: ICD-10-PCS | Mod: CPTII,S$GLB,, | Performed by: STUDENT IN AN ORGANIZED HEALTH CARE EDUCATION/TRAINING PROGRAM

## 2022-07-28 PROCEDURE — 74019 RADEX ABDOMEN 2 VIEWS: CPT | Mod: 26,,, | Performed by: RADIOLOGY

## 2022-07-28 PROCEDURE — 99213 PR OFFICE/OUTPT VISIT, EST, LEVL III, 20-29 MIN: ICD-10-PCS | Mod: S$GLB,,, | Performed by: STUDENT IN AN ORGANIZED HEALTH CARE EDUCATION/TRAINING PROGRAM

## 2022-07-28 PROCEDURE — 74019 XR ABDOMEN FLAT AND ERECT: ICD-10-PCS | Mod: 26,,, | Performed by: RADIOLOGY

## 2022-07-28 PROCEDURE — 1160F PR REVIEW ALL MEDS BY PRESCRIBER/CLIN PHARMACIST DOCUMENTED: ICD-10-PCS | Mod: CPTII,S$GLB,, | Performed by: STUDENT IN AN ORGANIZED HEALTH CARE EDUCATION/TRAINING PROGRAM

## 2022-07-28 PROCEDURE — 1159F MED LIST DOCD IN RCRD: CPT | Mod: CPTII,S$GLB,, | Performed by: STUDENT IN AN ORGANIZED HEALTH CARE EDUCATION/TRAINING PROGRAM

## 2022-07-28 PROCEDURE — 99999 PR PBB SHADOW E&M-EST. PATIENT-LVL V: ICD-10-PCS | Mod: PBBFAC,,, | Performed by: STUDENT IN AN ORGANIZED HEALTH CARE EDUCATION/TRAINING PROGRAM

## 2022-07-28 PROCEDURE — 99213 OFFICE O/P EST LOW 20 MIN: CPT | Mod: S$GLB,,, | Performed by: STUDENT IN AN ORGANIZED HEALTH CARE EDUCATION/TRAINING PROGRAM

## 2022-07-28 PROCEDURE — 1160F RVW MEDS BY RX/DR IN RCRD: CPT | Mod: CPTII,S$GLB,, | Performed by: STUDENT IN AN ORGANIZED HEALTH CARE EDUCATION/TRAINING PROGRAM

## 2022-07-28 RX ORDER — ONDANSETRON 8 MG/1
TABLET, ORALLY DISINTEGRATING ORAL
Status: ON HOLD | COMMUNITY
Start: 2022-07-07 | End: 2022-08-11 | Stop reason: SDUPTHER

## 2022-07-28 RX ORDER — PRAZOSIN HYDROCHLORIDE 1 MG/1
CAPSULE ORAL
COMMUNITY
Start: 2021-12-27 | End: 2022-12-29

## 2022-07-28 RX ORDER — BACLOFEN 10 MG/1
10 TABLET ORAL 3 TIMES DAILY PRN
COMMUNITY
Start: 2022-07-07 | End: 2022-12-29 | Stop reason: DRUGHIGH

## 2022-07-28 NOTE — PROGRESS NOTES
[facility] MEDICINE CLINIC NOTE    Patient Name: Leticia Zelaya  YOB: 1960    PRESENTING HISTORY   Chief Complaint:   Chief Complaint   Patient presents with    Diarrhea    Emesis        History of Present Illness:  Ms. Leticia Zelaya is a 62 y.o. female     Has lost 10 pounds in the last month.   Tremendous stress, panic attacks. Hx GI distress during episodes since 20s, but this feels different than normal.     The other day vomited after having a cup of tea.   2 weeks ago in [place] threw up 3/7 days.   Diarrhea with fecal urgency.     No pain until cramping episodes.   New medications: baclofen, THC.    Recently treated with steroids, abx for URI.     Surg: cholecystectomy                                                  Review of Systems   Constitutional: Negative for fever.   Gastrointestinal: Positive for diarrhea, nausea and vomiting. Negative for abdominal pain.   All other systems reviewed and are negative.        PAST HISTORY:     Past Medical History:   Diagnosis Date    Anemia     Anxiety     Depression     Depression     Hypertension     PTSD (post-traumatic stress disorder)        Past Surgical History:   Procedure Laterality Date    APPENDECTOMY      BREAST BIOPSY N/A     3 fibroadenomas removed.     BREAST FIBROADENOMA SURGERY Bilateral 1990's    3     CHOLECYSTECTOMY      SHOULDER SURGERY Right     TONSILLECTOMY         Family History   Problem Relation Age of Onset    Hypertension Father     Heart disease Neg Hx     Heart attack Neg Hx     Breast cancer Neg Hx     Ovarian cancer Neg Hx        Social History     Socioeconomic History    Marital status:    Tobacco Use    Smoking status: Never Smoker    Smokeless tobacco: Never Used   Substance and Sexual Activity    Alcohol use: Yes     Alcohol/week: 3.0 standard drinks     Types: 3 Glasses of wine per week     Comment: occasionally    Drug use: Not Currently    Sexual activity: Not Currently      Partners: Male     Social Determinants of Health     Financial Resource Strain: Low Risk     Difficulty of Paying Living Expenses: Not very hard   Food Insecurity: No Food Insecurity    Worried About Running Out of Food in the Last Year: Never true    Ran Out of Food in the Last Year: Never true   Transportation Needs: No Transportation Needs    Lack of Transportation (Medical): No    Lack of Transportation (Non-Medical): No   Physical Activity: Inactive    Days of Exercise per Week: 0 days    Minutes of Exercise per Session: 0 min   Stress: Stress Concern Present    Feeling of Stress : Very much   Social Connections: Unknown    Frequency of Communication with Friends and Family: Three times a week    Frequency of Social Gatherings with Friends and Family: Once a week    Active Member of Clubs or Organizations: No    Attends Club or Organization Meetings: Never    Marital Status:    Housing Stability: Low Risk     Unable to Pay for Housing in the Last Year: No    Number of Places Lived in the Last Year: 1    Unstable Housing in the Last Year: No       MEDICATIONS & ALLERGIES:     Current Outpatient Medications on File Prior to Visit   Medication Sig    ALPRAZolam (XANAX) 0.25 MG tablet TK 1/2 TO 1 T PO UP TO BID PRN    baclofen (LIORESAL) 10 MG tablet Take 10 mg by mouth 3 (three) times daily as needed.    baclofen 10 mg GrPk     budesonide (PULMICORT) 0.5 mg/2 mL nebulizer solution Take 0.5 mg by nebulization 2 (two) times daily. Controller    budesonide-formoterol 80-4.5 mcg (SYMBICORT) 80-4.5 mcg/actuation HFAA Inhale 2 puffs into the lungs 2 (two) times a day. Controller    cyclobenzaprine (FLEXERIL) 5 MG tablet TAKE 1 TABLET(5 MG) BY MOUTH THREE TIMES DAILY AS NEEDED FOR MUSCLE SPASMS    enalapril (VASOTEC) 5 MG tablet Take 2.5 mg by mouth once daily.    fluticasone propionate (FLONASE) 50 mcg/actuation nasal spray by Each Nostril route.    gabapentin (NEURONTIN) 100 MG  "capsule Take 100 mg by mouth 3 (three) times daily.    NURTEC 75 mg odt PLACE 1 T ON THE TONGUE AND ALLOW TO DISSOLVE ONCE D PRN    omega-3 acid ethyl esters (LOVAZA) 1 gram capsule TK 2 CS PO QD    ondansetron (ZOFRAN-ODT) 8 MG TbDL     prazosin (MINIPRESS) 1 MG Cap     ramelteon (ROZEREM) 8 mg tablet TK 1 T PO QHS PRN    rizatriptan (MAXALT-MLT) 10 MG disintegrating tablet PLACE 1 T ON THE TONGUE AND ALLOW TO DISSOLVE ONE TIME MAY REPEAT ONCE IN  2 H X2. MAX DAILY DOSE 30MG/24H    SUMAtriptan (IMITREX) 20 mg/actuation nasal spray     triamterene-hydrochlorothiazide 37.5-25 mg (MAXZIDE-25) 37.5-25 mg per tablet Take 1 tablet by mouth once daily.     TRINTELLIX 20 mg Tab Take 1 tablet by mouth once daily.    ZINC PICOLINATE ORAL Take by mouth. Take 1 tab each morning.    estradioL (ESTRACE) 0.01 % (0.1 mg/gram) vaginal cream Place 2 g vaginally every evening for 14 days, THEN 1 g every evening for 14 days, THEN 1 g twice a week for 14 days.    [DISCONTINUED] clonazePAM (KLONOPIN) 0.5 MG tablet SMARTSI.5-1 Tablet(s) By Mouth 1 to 2 Times Daily PRN    [DISCONTINUED] prazosin (MINIPRESS) 2 MG Cap Take 2-4 mg by mouth nightly.     No current facility-administered medications on file prior to visit.       Review of patient's allergies indicates:   Allergen Reactions    Fish containing products Hives, Itching, Rash and Swelling       OBJECTIVE:   Vital Signs:  Vitals:    22 1258   BP: 112/66   Pulse: 92   Resp: 18   SpO2: 96%   Weight: 90.6 kg (199 lb 11.8 oz)   Height: 5' 8" (1.727 m)       No results found for this or any previous visit (from the past 24 hour(s)).      Physical Exam  Vitals and nursing note reviewed.   Constitutional:       Appearance: She is not ill-appearing, toxic-appearing or diaphoretic.   Neck:      Thyroid: No thyromegaly.   Cardiovascular:      Rate and Rhythm: Normal rate and regular rhythm.   Pulmonary:      Effort: Pulmonary effort is normal. No respiratory distress. "   Abdominal:      General: Abdomen is flat. There is no distension.      Palpations: Abdomen is soft.      Tenderness: There is no abdominal tenderness. There is no right CVA tenderness, left CVA tenderness, guarding or rebound.   Musculoskeletal:         General: No tenderness or deformity. Normal range of motion.   Skin:     General: Skin is warm and dry.      Findings: No erythema or rash.   Neurological:      Mental Status: She is alert and oriented to person, place, and time.      Gait: Gait is intact.   Psychiatric:         Mood and Affect: Mood and affect normal.         Cognition and Memory: Memory normal.         Judgment: Judgment normal.         ASSESSMENT & PLAN:     Diarrhea, unspecified type  -     WBC, Stool; Future; Expected date: 07/28/2022  -     Pancreatic elastase, fecal; Future; Expected date: 07/28/2022  -     Clostridium difficile EIA; Future; Expected date: 07/28/2022  -     Gastrointestinal Pathogens Panel, PCR; Future; Expected date: 07/28/2022  -     Calprotectin, Stool; Future; Expected date: 07/28/2022  -     Fecal Immunochemical Test (iFOBT); Future; Expected date: 07/28/2022    Nausea and vomiting, intractability of vomiting not specified, unspecified vomiting type  -     LIPASE; Future; Expected date: 07/28/2022  -     CBC Auto Differential; Future; Expected date: 07/28/2022  -     Comprehensive Metabolic Panel; Future; Expected date: 07/28/2022  -     X-Ray Abdomen Flat And Erect; Future; Expected date: 07/28/2022  -     TSH; Future; Expected date: 07/28/2022      Unclear etiology of N/V/D with 10 lb weight loss.     Start with above investigation.     If unrevealing will have her see GI, consider endoscopy/C-scope.          Josh Aj MD   Internal Medicine    This note was created using Dragon voice recognition software that occasionally misinterprets phrases or words.

## 2022-07-29 DIAGNOSIS — E87.6 HYPOKALEMIA: Primary | ICD-10-CM

## 2022-07-29 RX ORDER — POTASSIUM CHLORIDE 20 MEQ/1
20 TABLET, EXTENDED RELEASE ORAL 2 TIMES DAILY
Qty: 60 TABLET | Refills: 2 | Status: SHIPPED | OUTPATIENT
Start: 2022-07-29 | End: 2022-10-31

## 2022-08-01 ENCOUNTER — LAB VISIT (OUTPATIENT)
Dept: LAB | Facility: HOSPITAL | Age: 62
End: 2022-08-01
Attending: STUDENT IN AN ORGANIZED HEALTH CARE EDUCATION/TRAINING PROGRAM
Payer: COMMERCIAL

## 2022-08-01 DIAGNOSIS — R19.7 DIARRHEA, UNSPECIFIED TYPE: ICD-10-CM

## 2022-08-01 PROCEDURE — 82274 ASSAY TEST FOR BLOOD FECAL: CPT | Performed by: STUDENT IN AN ORGANIZED HEALTH CARE EDUCATION/TRAINING PROGRAM

## 2022-08-04 LAB — HEMOCCULT STL QL IA: NEGATIVE

## 2022-08-06 ENCOUNTER — PATIENT MESSAGE (OUTPATIENT)
Dept: FAMILY MEDICINE | Facility: CLINIC | Age: 62
End: 2022-08-06
Payer: COMMERCIAL

## 2022-08-06 DIAGNOSIS — R11.2 NAUSEA AND VOMITING, INTRACTABILITY OF VOMITING NOT SPECIFIED, UNSPECIFIED VOMITING TYPE: Primary | ICD-10-CM

## 2022-08-08 ENCOUNTER — PATIENT MESSAGE (OUTPATIENT)
Dept: FAMILY MEDICINE | Facility: CLINIC | Age: 62
End: 2022-08-08
Payer: COMMERCIAL

## 2022-08-08 NOTE — TELEPHONE ENCOUNTER
Appointment scheduled for the date of 8-9-22. Patient agreed to appointment date, time, and location.

## 2022-08-09 ENCOUNTER — OFFICE VISIT (OUTPATIENT)
Dept: GASTROENTEROLOGY | Facility: CLINIC | Age: 62
End: 2022-08-09
Payer: COMMERCIAL

## 2022-08-09 VITALS
SYSTOLIC BLOOD PRESSURE: 115 MMHG | DIASTOLIC BLOOD PRESSURE: 77 MMHG | BODY MASS INDEX: 28.77 KG/M2 | HEART RATE: 77 BPM | HEIGHT: 68 IN | WEIGHT: 189.81 LBS

## 2022-08-09 DIAGNOSIS — F41.0 PANIC ATTACKS: ICD-10-CM

## 2022-08-09 DIAGNOSIS — Z86.010 HISTORY OF COLON POLYPS: ICD-10-CM

## 2022-08-09 DIAGNOSIS — R19.4 CHANGE IN BOWEL HABITS: ICD-10-CM

## 2022-08-09 DIAGNOSIS — R19.7 DIARRHEA, UNSPECIFIED TYPE: ICD-10-CM

## 2022-08-09 DIAGNOSIS — R63.4 WEIGHT LOSS: ICD-10-CM

## 2022-08-09 DIAGNOSIS — F43.9 STRESS: ICD-10-CM

## 2022-08-09 DIAGNOSIS — R13.10 DYSPHAGIA, UNSPECIFIED TYPE: ICD-10-CM

## 2022-08-09 DIAGNOSIS — R63.0 DECREASED APPETITE: ICD-10-CM

## 2022-08-09 DIAGNOSIS — R10.84 GENERALIZED ABDOMINAL PAIN: ICD-10-CM

## 2022-08-09 DIAGNOSIS — R11.2 NAUSEA AND VOMITING, INTRACTABILITY OF VOMITING NOT SPECIFIED, UNSPECIFIED VOMITING TYPE: Primary | ICD-10-CM

## 2022-08-09 PROCEDURE — 3008F PR BODY MASS INDEX (BMI) DOCUMENTED: ICD-10-PCS | Mod: CPTII,S$GLB,,

## 2022-08-09 PROCEDURE — 1160F RVW MEDS BY RX/DR IN RCRD: CPT | Mod: CPTII,S$GLB,,

## 2022-08-09 PROCEDURE — 3074F SYST BP LT 130 MM HG: CPT | Mod: CPTII,S$GLB,,

## 2022-08-09 PROCEDURE — 1159F MED LIST DOCD IN RCRD: CPT | Mod: CPTII,S$GLB,,

## 2022-08-09 PROCEDURE — 99204 OFFICE O/P NEW MOD 45 MIN: CPT | Mod: S$GLB,,,

## 2022-08-09 PROCEDURE — 3008F BODY MASS INDEX DOCD: CPT | Mod: CPTII,S$GLB,,

## 2022-08-09 PROCEDURE — 3078F PR MOST RECENT DIASTOLIC BLOOD PRESSURE < 80 MM HG: ICD-10-PCS | Mod: CPTII,S$GLB,,

## 2022-08-09 PROCEDURE — 3078F DIAST BP <80 MM HG: CPT | Mod: CPTII,S$GLB,,

## 2022-08-09 PROCEDURE — 4010F ACE/ARB THERAPY RXD/TAKEN: CPT | Mod: CPTII,S$GLB,,

## 2022-08-09 PROCEDURE — 4010F PR ACE/ARB THEARPY RXD/TAKEN: ICD-10-PCS | Mod: CPTII,S$GLB,,

## 2022-08-09 PROCEDURE — 99204 PR OFFICE/OUTPT VISIT, NEW, LEVL IV, 45-59 MIN: ICD-10-PCS | Mod: S$GLB,,,

## 2022-08-09 PROCEDURE — 99999 PR PBB SHADOW E&M-EST. PATIENT-LVL V: ICD-10-PCS | Mod: PBBFAC,,,

## 2022-08-09 PROCEDURE — 1160F PR REVIEW ALL MEDS BY PRESCRIBER/CLIN PHARMACIST DOCUMENTED: ICD-10-PCS | Mod: CPTII,S$GLB,,

## 2022-08-09 PROCEDURE — 99999 PR PBB SHADOW E&M-EST. PATIENT-LVL V: CPT | Mod: PBBFAC,,,

## 2022-08-09 PROCEDURE — 1159F PR MEDICATION LIST DOCUMENTED IN MEDICAL RECORD: ICD-10-PCS | Mod: CPTII,S$GLB,,

## 2022-08-09 PROCEDURE — 3074F PR MOST RECENT SYSTOLIC BLOOD PRESSURE < 130 MM HG: ICD-10-PCS | Mod: CPTII,S$GLB,,

## 2022-08-09 RX ORDER — CHOLESTYRAMINE 4 G/9G
4 POWDER, FOR SUSPENSION ORAL DAILY
Qty: 30 PACKET | Refills: 0 | Status: SHIPPED | OUTPATIENT
Start: 2022-08-09 | End: 2022-08-10

## 2022-08-09 RX ORDER — PROMETHAZINE HYDROCHLORIDE 12.5 MG/1
12.5 TABLET ORAL EVERY 6 HOURS PRN
Qty: 30 TABLET | Refills: 0 | Status: SHIPPED | OUTPATIENT
Start: 2022-08-09

## 2022-08-09 NOTE — H&P (VIEW-ONLY)
Subjective:       Patient ID: Leticia Zelaya is a 62 y.o. female Body mass index is 28.86 kg/m².    Chief Complaint: Nausea and Emesis    This patient is new to me.  Referring Provider: Dr. Josh Aj for nausea and vomiting.     GI Problem  The primary symptoms include weight loss, abdominal pain, nausea (reports zofran makes nausea worse r/t taste), vomiting and diarrhea. Primary symptoms do not include fever, fatigue, melena, hematemesis, jaundice, hematochezia or dysuria.   The weight loss began more than 2 weeks ago (reports she has had decreased appetite and is experiencing frequent N/V). Weight loss amount: documented weight loss of 19 lbs since 03/04/22. There has been a change in the fit of her clothing.   The abdominal pain began more than 2 days ago (occurred Sunday with vomiting and diarrhea). The abdominal pain has been gradually improving since its onset. The abdominal pain is located in the epigastric region. The abdominal pain does not radiate. The severity of the abdominal pain is 0/10 (denies pain currently; reports mild discomfort). The abdominal pain is relieved by bowel movements.   The vomiting began more than 2 days ago (started at the end of 06/22). Frequency: occurs every 2-3 days; reports having to vomiting multiple times during each episodes. The emesis contains stomach contents, undigested food and bilious material (denies bloody or coffee grind emesis). Risk factors: reports using THC tincture for sleep, but stopped recently; denies suspect food or foreign travel.   The diarrhea began more than 1 week ago (started at the end of 06/22; stool studies negative 08/01/22). Diarrhea characteristics: rated stool 7 on bristol scale the past few days, but is a 5 at times; reports hx of constipation prior to this change in bowel habits that was managed with miralax. Daily occurrences: reports having 3-6 BM daily; stress triggers diarrhea; reports using imodium PRN, but hesitant to use  daily due to possible constipation. Risk factors for illness producing diarrhea include recent antibiotic use (recently treated for URI with antibiotics).   The illness is also significant for dysphagia (occurs when swallowing potassium pill) and constipation (denies currently). The illness does not include chills, anorexia, odynophagia or bloating. Associated symptoms comments: Also reports increased life stressors & hx of of panic attacks that worsen symptoms. Significant associated medical issues include hemorrhoids. Associated medical issues do not include inflammatory bowel disease, GERD, gallstones, liver disease, alcohol abuse, PUD, gastric bypass, bowel resection, irritable bowel syndrome or diverticulitis.     Review of Systems   Constitutional: Positive for appetite change (decreased) and weight loss. Negative for activity change, chills, diaphoresis, fatigue, fever and unexpected weight change.   HENT: Positive for trouble swallowing. Negative for sore throat.    Respiratory: Negative for cough, choking and shortness of breath.    Cardiovascular: Negative for chest pain.   Gastrointestinal: Positive for abdominal pain, constipation (denies currently), diarrhea, dysphagia (occurs when swallowing potassium pill), nausea (reports zofran makes nausea worse r/t taste) and vomiting. Negative for abdominal distention, anal bleeding, anorexia, bloating, blood in stool, hematemesis, hematochezia, jaundice, melena and rectal pain.   Genitourinary: Negative for dysuria.       No LMP recorded. Patient is postmenopausal.  Past Medical History:   Diagnosis Date    Anemia     Anxiety     Colon polyp     Depression     Depression     Hypertension     PTSD (post-traumatic stress disorder)      Past Surgical History:   Procedure Laterality Date    APPENDECTOMY      BREAST BIOPSY N/A     3 fibroadenomas removed.     BREAST FIBROADENOMA SURGERY Bilateral 1990's    3     CHOLECYSTECTOMY      COLONOSCOPY       SHOULDER SURGERY Right     TONSILLECTOMY       Family History   Problem Relation Age of Onset    Hypertension Father     Colon cancer Maternal Uncle 75    Heart disease Neg Hx     Heart attack Neg Hx     Breast cancer Neg Hx     Ovarian cancer Neg Hx     Colon polyps Neg Hx     Crohn's disease Neg Hx     Ulcerative colitis Neg Hx      Social History     Tobacco Use    Smoking status: Never Smoker    Smokeless tobacco: Never Used   Substance Use Topics    Alcohol use: Yes     Alcohol/week: 3.0 standard drinks     Types: 3 Glasses of wine per week     Comment: rare    Drug use: Not Currently     Wt Readings from Last 10 Encounters:   08/09/22 86.1 kg (189 lb 13.1 oz)   07/28/22 90.6 kg (199 lb 11.8 oz)   03/04/22 94.5 kg (208 lb 5.4 oz)   01/02/22 94.3 kg (208 lb)   09/23/21 93.9 kg (207 lb 0.2 oz)   05/24/21 92 kg (202 lb 13.2 oz)   12/14/20 88.7 kg (195 lb 8.8 oz)   12/07/20 88.6 kg (195 lb 5.2 oz)   11/25/20 92.6 kg (204 lb 2.3 oz)   11/22/18 73.9 kg (163 lb)     Lab Results   Component Value Date    WBC 7.09 07/28/2022    HGB 13.1 07/28/2022    HCT 41.0 07/28/2022    MCV 88 07/28/2022     07/28/2022     CMP  Sodium   Date Value Ref Range Status   07/28/2022 140 136 - 145 mmol/L Final     Potassium   Date Value Ref Range Status   07/28/2022 3.1 (L) 3.5 - 5.1 mmol/L Final     Chloride   Date Value Ref Range Status   07/28/2022 101 95 - 110 mmol/L Final     CO2   Date Value Ref Range Status   07/28/2022 25 23 - 29 mmol/L Final     Glucose   Date Value Ref Range Status   07/28/2022 110 70 - 110 mg/dL Final     BUN   Date Value Ref Range Status   07/28/2022 15 8 - 23 mg/dL Final     Creatinine   Date Value Ref Range Status   07/28/2022 0.8 0.5 - 1.4 mg/dL Final     Calcium   Date Value Ref Range Status   07/28/2022 9.6 8.7 - 10.5 mg/dL Final     Total Protein   Date Value Ref Range Status   07/28/2022 7.3 6.0 - 8.4 g/dL Final     Albumin   Date Value Ref Range Status   07/28/2022 4.0 3.5 - 5.2 g/dL  Final     Total Bilirubin   Date Value Ref Range Status   07/28/2022 0.4 0.1 - 1.0 mg/dL Final     Comment:     For infants and newborns, interpretation of results should be based  on gestational age, weight and in agreement with clinical  observations.    Premature Infant recommended reference ranges:  Up to 24 hours.............<8.0 mg/dL  Up to 48 hours............<12.0 mg/dL  3-5 days..................<15.0 mg/dL  6-29 days.................<15.0 mg/dL       Alkaline Phosphatase   Date Value Ref Range Status   07/28/2022 70 55 - 135 U/L Final     AST   Date Value Ref Range Status   07/28/2022 15 10 - 40 U/L Final     ALT   Date Value Ref Range Status   07/28/2022 15 10 - 44 U/L Final     Anion Gap   Date Value Ref Range Status   07/28/2022 14 8 - 16 mmol/L Final     eGFR if    Date Value Ref Range Status   07/28/2022 >60.0 >60 mL/min/1.73 m^2 Final     eGFR if non    Date Value Ref Range Status   07/28/2022 >60.0 >60 mL/min/1.73 m^2 Final     Comment:     Calculation used to obtain the estimated glomerular filtration  rate (eGFR) is the CKD-EPI equation.        Lab Results   Component Value Date    AMYLASE 68 05/13/2010     Lab Results   Component Value Date    LIPASE 46 07/28/2022     Lab Results   Component Value Date    TSH 0.592 07/28/2022       Reviewed prior medical records including radiology report of KUB 07/28/22 & endoscopy history (see surgical history).    Objective:      Physical Exam  Vitals and nursing note reviewed.   Constitutional:       General: She is not in acute distress.     Appearance: Normal appearance. She is not ill-appearing.   Eyes:      Extraocular Movements: Extraocular movements intact.      Pupils: Pupils are equal, round, and reactive to light.   Cardiovascular:      Rate and Rhythm: Normal rate and regular rhythm.      Pulses: Normal pulses.   Pulmonary:      Effort: Pulmonary effort is normal.      Breath sounds: Normal breath sounds.    Abdominal:      General: Bowel sounds are normal. There is no distension.      Palpations: There is no mass.      Tenderness: There is no abdominal tenderness. There is no guarding or rebound.      Hernia: No hernia is present.   Skin:     General: Skin is warm and dry.      Coloration: Skin is not jaundiced or pale.   Neurological:      Mental Status: She is alert and oriented to person, place, and time.   Psychiatric:         Mood and Affect: Mood normal.         Behavior: Behavior normal.         Assessment:       1. Nausea and vomiting, intractability of vomiting not specified, unspecified vomiting type    2. Diarrhea, unspecified type    3. Change in bowel habits    4. Generalized abdominal pain    5. Weight loss    6. Dysphagia, unspecified type    7. Decreased appetite    8. Stress    9. Panic attacks    10. History of colon polyps        Plan:       Nausea and vomiting, intractability of vomiting not specified, unspecified vomiting type  - schedule EGD, discussed procedure with patient, including risks and benefits, patient verbalized understanding  -Avoid known triggers  -Recommend adequate water intake (six 8-oz glasses of water daily)  -Consider GES  -START: promethazine (PHENERGAN) 12.5 MG Tab; Take 1 tablet (12.5 mg total) by mouth every 6 (six) hours as needed (nausea/vomiting).  Dispense: 30 tablet; Refill: 0  -     Case Request Endoscopy: EGD (ESOPHAGOGASTRODUODENOSCOPY), COLONOSCOPY    Diarrhea, unspecified type  - schedule Colonoscopy, discussed procedure with the patient, including risks and benefits, patient verbalized understanding  - recommended OTC probiotic, such as Florastor or Culturelle, taken as directed on packaging  - avoid lactose, alcohol, & caffeine  - avoid known triggers  - Recommended increase fiber in diet, especially soluble fiber since this can help bulk up the stool consistency and may help to slow down how fast the stool goes through the colon and can prevent  diarrhea  -START: cholestyramine (QUESTRAN) 4 gram packet; Take 1 packet (4 g total) by mouth once daily.  Dispense: 30 packet; Refill: 0  -     Case Request Endoscopy: EGD (ESOPHAGOGASTRODUODENOSCOPY), COLONOSCOPY    Change in bowel habits  - schedule Colonoscopy, discussed procedure with the patient, including risks and benefits, patient verbalized understanding    Generalized abdominal pain  - schedule EGD, discussed procedure with patient, including risks and benefits, patient verbalized understanding  - schedule Colonoscopy, discussed procedure with the patient, including risks and benefits, patient verbalized understanding  -     US Abdomen Complete; Future; Expected date: 08/09/2022    Weight loss  - schedule EGD, discussed procedure with patient, including risks and benefits, patient verbalized understanding  - schedule Colonoscopy, discussed procedure with the patient, including risks and benefits, patient verbalized understanding  -     Case Request Endoscopy: EGD (ESOPHAGOGASTRODUODENOSCOPY), COLONOSCOPY    Dysphagia, unspecified type  - schedule EGD, discussed procedure with patient and possible esophageal dilation may be performed during procedure if indicated, patient verbalized understanding  - educated patient to eat smaller more frequent meals and to eat slowly and advised to eat a soft diet.  - possible UGI/esophagram/esophageal manometry if symptoms persist    Decreased appetite  - schedule EGD, discussed procedure with patient, including risks and benefits, patient verbalized understanding  - encouraged PO intake and daily calorie counts to ensure adequate nutrition is taken in, recommend at least 1,800-2,000 calories a day  - recommend nutritional drinks, such as Boost, Ensure or Glucerna, to supplement nutrition needs    Stress & Panic attacks  -Recommend daily exercise as tolerated  -Follow-up with PCP and/or psychologist for continued evaluation and management     History of colon polyps  -  schedule Colonoscopy, discussed procedure with the patient, including risks and benefits, patient verbalized understanding    Follow up in about 4 weeks (around 9/6/2022), or if symptoms worsen or fail to improve.      If no improvement in symptoms or symptoms worsen, call/follow-up at clinic or go to ER.        45 minutes of total time spent on the encounter, which includes face to face time and non-face to face time preparing to see the patient (eg, review of tests), Obtaining and/or reviewing separately obtained history, Documenting clinical information in the electronic or other health record, Independently interpreting results (not separately reported) and communicating results to the patient/family/caregiver, or Care coordination (not separately reported).

## 2022-08-11 ENCOUNTER — ANESTHESIA (OUTPATIENT)
Dept: ENDOSCOPY | Facility: HOSPITAL | Age: 62
End: 2022-08-11
Payer: COMMERCIAL

## 2022-08-11 ENCOUNTER — ANESTHESIA EVENT (OUTPATIENT)
Dept: ENDOSCOPY | Facility: HOSPITAL | Age: 62
End: 2022-08-11
Payer: COMMERCIAL

## 2022-08-11 ENCOUNTER — HOSPITAL ENCOUNTER (OUTPATIENT)
Facility: HOSPITAL | Age: 62
Discharge: HOME | End: 2022-08-11
Attending: INTERNAL MEDICINE | Admitting: INTERNAL MEDICINE
Payer: COMMERCIAL

## 2022-08-11 VITALS
OXYGEN SATURATION: 99 % | SYSTOLIC BLOOD PRESSURE: 132 MMHG | DIASTOLIC BLOOD PRESSURE: 71 MMHG | HEART RATE: 79 BPM | RESPIRATION RATE: 16 BRPM | TEMPERATURE: 98 F

## 2022-08-11 DIAGNOSIS — R11.2 NAUSEA & VOMITING: ICD-10-CM

## 2022-08-11 PROCEDURE — D9220A PRA ANESTHESIA: ICD-10-PCS | Mod: ANES,,, | Performed by: ANESTHESIOLOGY

## 2022-08-11 PROCEDURE — 45380 COLONOSCOPY AND BIOPSY: CPT | Performed by: INTERNAL MEDICINE

## 2022-08-11 PROCEDURE — 88305 TISSUE EXAM BY PATHOLOGIST: ICD-10-PCS | Mod: 26,,, | Performed by: STUDENT IN AN ORGANIZED HEALTH CARE EDUCATION/TRAINING PROGRAM

## 2022-08-11 PROCEDURE — 88342 IMHCHEM/IMCYTCHM 1ST ANTB: CPT | Performed by: STUDENT IN AN ORGANIZED HEALTH CARE EDUCATION/TRAINING PROGRAM

## 2022-08-11 PROCEDURE — 45380 COLONOSCOPY AND BIOPSY: CPT | Mod: ,,, | Performed by: INTERNAL MEDICINE

## 2022-08-11 PROCEDURE — 25000003 PHARM REV CODE 250: Performed by: INTERNAL MEDICINE

## 2022-08-11 PROCEDURE — 63600175 PHARM REV CODE 636 W HCPCS: Performed by: ANESTHESIOLOGY

## 2022-08-11 PROCEDURE — 27201012 HC FORCEPS, HOT/COLD, DISP: Performed by: INTERNAL MEDICINE

## 2022-08-11 PROCEDURE — 25000003 PHARM REV CODE 250: Performed by: NURSE ANESTHETIST, CERTIFIED REGISTERED

## 2022-08-11 PROCEDURE — 43239 PR EGD, FLEX, W/BIOPSY, SGL/MULTI: ICD-10-PCS | Mod: 51,,, | Performed by: INTERNAL MEDICINE

## 2022-08-11 PROCEDURE — 43239 EGD BIOPSY SINGLE/MULTIPLE: CPT | Mod: 51,,, | Performed by: INTERNAL MEDICINE

## 2022-08-11 PROCEDURE — D9220A PRA ANESTHESIA: Mod: ANES,,, | Performed by: ANESTHESIOLOGY

## 2022-08-11 PROCEDURE — 45380 PR COLONOSCOPY,BIOPSY: ICD-10-PCS | Mod: ,,, | Performed by: INTERNAL MEDICINE

## 2022-08-11 PROCEDURE — 88342 IMHCHEM/IMCYTCHM 1ST ANTB: CPT | Mod: 26,,, | Performed by: STUDENT IN AN ORGANIZED HEALTH CARE EDUCATION/TRAINING PROGRAM

## 2022-08-11 PROCEDURE — 37000009 HC ANESTHESIA EA ADD 15 MINS: Performed by: INTERNAL MEDICINE

## 2022-08-11 PROCEDURE — 43239 EGD BIOPSY SINGLE/MULTIPLE: CPT | Performed by: INTERNAL MEDICINE

## 2022-08-11 PROCEDURE — 88342 CHG IMMUNOCYTOCHEMISTRY: ICD-10-PCS | Mod: 26,,, | Performed by: STUDENT IN AN ORGANIZED HEALTH CARE EDUCATION/TRAINING PROGRAM

## 2022-08-11 PROCEDURE — D9220A PRA ANESTHESIA: ICD-10-PCS | Mod: CRNA,,, | Performed by: NURSE ANESTHETIST, CERTIFIED REGISTERED

## 2022-08-11 PROCEDURE — 63600175 PHARM REV CODE 636 W HCPCS: Performed by: NURSE ANESTHETIST, CERTIFIED REGISTERED

## 2022-08-11 PROCEDURE — 88305 TISSUE EXAM BY PATHOLOGIST: CPT | Performed by: STUDENT IN AN ORGANIZED HEALTH CARE EDUCATION/TRAINING PROGRAM

## 2022-08-11 PROCEDURE — D9220A PRA ANESTHESIA: Mod: CRNA,,, | Performed by: NURSE ANESTHETIST, CERTIFIED REGISTERED

## 2022-08-11 PROCEDURE — 37000008 HC ANESTHESIA 1ST 15 MINUTES: Performed by: INTERNAL MEDICINE

## 2022-08-11 PROCEDURE — 88305 TISSUE EXAM BY PATHOLOGIST: CPT | Mod: 26,,, | Performed by: STUDENT IN AN ORGANIZED HEALTH CARE EDUCATION/TRAINING PROGRAM

## 2022-08-11 RX ORDER — OMEPRAZOLE 40 MG/1
40 CAPSULE, DELAYED RELEASE ORAL DAILY
Qty: 90 CAPSULE | Refills: 2 | Status: SHIPPED | OUTPATIENT
Start: 2022-08-11 | End: 2022-12-29 | Stop reason: SDUPTHER

## 2022-08-11 RX ORDER — SODIUM CHLORIDE 9 MG/ML
INJECTION, SOLUTION INTRAVENOUS CONTINUOUS
Status: DISCONTINUED | OUTPATIENT
Start: 2022-08-11 | End: 2022-08-11 | Stop reason: HOSPADM

## 2022-08-11 RX ORDER — LIDOCAINE HCL/PF 100 MG/5ML
SYRINGE (ML) INTRAVENOUS
Status: DISCONTINUED | OUTPATIENT
Start: 2022-08-11 | End: 2022-08-11

## 2022-08-11 RX ORDER — ONDANSETRON 2 MG/ML
4 INJECTION INTRAMUSCULAR; INTRAVENOUS ONCE
Status: COMPLETED | OUTPATIENT
Start: 2022-08-11 | End: 2022-08-11

## 2022-08-11 RX ORDER — PROPOFOL 10 MG/ML
VIAL (ML) INTRAVENOUS
Status: DISCONTINUED | OUTPATIENT
Start: 2022-08-11 | End: 2022-08-11

## 2022-08-11 RX ORDER — ONDANSETRON 8 MG/1
8 TABLET, ORALLY DISINTEGRATING ORAL EVERY 8 HOURS PRN
Qty: 30 TABLET | Refills: 3 | Status: SHIPPED | OUTPATIENT
Start: 2022-08-11

## 2022-08-11 RX ADMIN — PROPOFOL 50 MG: 10 INJECTION, EMULSION INTRAVENOUS at 12:08

## 2022-08-11 RX ADMIN — PROPOFOL 50 MG: 10 INJECTION, EMULSION INTRAVENOUS at 11:08

## 2022-08-11 RX ADMIN — SODIUM CHLORIDE: 0.9 INJECTION, SOLUTION INTRAVENOUS at 10:08

## 2022-08-11 RX ADMIN — PROPOFOL 100 MG: 10 INJECTION, EMULSION INTRAVENOUS at 11:08

## 2022-08-11 RX ADMIN — ONDANSETRON 4 MG: 2 INJECTION INTRAMUSCULAR; INTRAVENOUS at 11:08

## 2022-08-11 RX ADMIN — LIDOCAINE HYDROCHLORIDE 100 MG: 20 INJECTION INTRAVENOUS at 11:08

## 2022-08-11 RX ADMIN — GLYCOPYRROLATE 0.2 MG: 0.2 INJECTION, SOLUTION INTRAMUSCULAR; INTRAVITREAL at 11:08

## 2022-08-11 NOTE — ANESTHESIA PREPROCEDURE EVALUATION
08/11/2022  Leticia Zelaya is a 62 y.o., female.      Pre-op Assessment    I have reviewed the Patient Summary Reports.     I have reviewed the Nursing Notes. I have reviewed the NPO Status.   I have reviewed the Medications.     Review of Systems  Anesthesia Hx:  Hx of Anesthetic complications PONV   Social:  Non-Smoker, Social Alcohol Use    Hematology/Oncology:     Oncology Normal    -- Anemia:   EENT/Dental:EENT/Dental Normal   Cardiovascular:   Hypertension    Pulmonary:  Pulmonary Normal    Hepatic/GI:   Bowel Prep.    Musculoskeletal:  Musculoskeletal Normal    Neurological:  Neurology Normal    Endocrine:  Endocrine Normal    Psych:   Psychiatric History anxiety depression          Physical Exam  General: Well nourished, Cooperative, Alert and Oriented    Airway:  Mallampati: II   Mouth Opening: Normal  TM Distance: Normal  Neck ROM: Normal ROM    Dental:  Intact        Anesthesia Plan  Type of Anesthesia, risks & benefits discussed:    Anesthesia Type: Gen Natural Airway  Intra-op Monitoring Plan: Standard ASA Monitors  Induction:  IV  Informed Consent: Informed consent signed with the Patient and all parties understand the risks and agree with anesthesia plan.  All questions answered.   ASA Score: 2  Day of Surgery Review of History & Physical: H&P Update referred to the surgeon/provider.    Ready For Surgery From Anesthesia Perspective.     .

## 2022-08-11 NOTE — TRANSFER OF CARE
Anesthesia Transfer of Care Note    Patient: Leticia Zelaya    Procedure(s) Performed: Procedure(s) (LRB):  EGD (ESOPHAGOGASTRODUODENOSCOPY) (N/A)  COLONOSCOPY (N/A)    Patient location: PACU    Anesthesia Type: general    Transport from OR: Transported from OR on 2-3 L/min O2 by NC with adequate spontaneous ventilation    Post pain: adequate analgesia    Post assessment: no apparent anesthetic complications and tolerated procedure well    Post vital signs: stable    Level of consciousness: sedated and responds to stimulation    Nausea/Vomiting: no nausea/vomiting    Complications: none    Transfer of care protocol was followed      Last vitals:   Visit Vitals  /68 (BP Location: Left arm, Patient Position: Lying)   Pulse 67   Temp 37.3 °C (99.1 °F) (Skin)   Resp 18   SpO2 (!) 94%   Breastfeeding No

## 2022-08-11 NOTE — PLAN OF CARE
Vss, yvonne po fluids, denies pain, ambulates easily. IV removed, catheter intact. Dr. Phillips reviewed findings with pt. Discharge instructions provided and states understanding. States ready to go home.  Discharged from facility with family per wheelchair.

## 2022-08-11 NOTE — PROVATION PATIENT INSTRUCTIONS
Discharge Summary/Instructions after an Endoscopic Procedure  Patient Name: Leticia Zelaya  Patient MRN: [id number]  Patient YOB: 1960 Thursday, August 11, 2022  Jael Phillips MD  Dear patient,  As a result of recent federal legislation (The Federal Cures Act), you may   receive lab or pathology results from your procedure in your [facility]   account before your physician is able to contact you. Your physician or   their representative will relay the results to you with their   recommendations at their soonest availability.  Thank you,  RESTRICTIONS:  During your procedure today, you received medications for sedation.  These   medications may affect your judgment, balance and coordination.  Therefore,   for 24 hours, you have the following restrictions:   - DO NOT drive a car, operate machinery, make legal/financial decisions,   sign important papers or drink alcohol.    ACTIVITY:  Today: no heavy lifting, straining or running due to procedural   sedation/anesthesia.  The following day: return to full activity including work.  DIET:  Eat and drink normally unless instructed otherwise.     TREATMENT FOR COMMON SIDE EFFECTS:  - Mild abdominal pain, nausea, belching, bloating or excessive gas:  rest,   eat lightly and use a heating pad.  - Sore Throat: treat with throat lozenges and/or gargle with warm salt   water.  - Because air was used during the procedure, expelling large amounts of air   from your rectum or belching is normal.  - If a bowel prep was taken, you may not have a bowel movement for 1-3 days.    This is normal.  SYMPTOMS TO WATCH FOR AND REPORT TO YOUR PHYSICIAN:  1. Abdominal pain or bloating, other than gas cramps.  2. Chest pain.  3. Back pain.  4. Signs of infection such as: chills or fever occurring within 24 hours   after the procedure.  5. Rectal bleeding, which would show as bright red, maroon, or black stools.   (A tablespoon of blood from the rectum is not serious,  especially if   hemorrhoids are present.)  6. Vomiting.  7. Weakness or dizziness.  GO DIRECTLY TO THE NEAREST EMERGENCY ROOM IF YOU HAVE ANY OF THE FOLLOWING:      Difficulty breathing              Chills and/or fever over 101 F   Persistent vomiting and/or vomiting blood   Severe abdominal pain   Severe chest pain   Black, tarry stools   Bleeding- more than one tablespoon   Any other symptom or condition that you feel may need urgent attention  Your doctor recommends these additional instructions:  If any biopsies were taken, your doctors clinic will contact you in 1 to 2   weeks with any results.  - Discharge patient to home (with escort).   - Patient has a contact number available for emergencies.  The signs and   symptoms of potential delayed complications were discussed with the   patient.  Return to normal activities tomorrow.  Written discharge   instructions were provided to the patient.   - Resume previous diet.   - Continue present medications.   - Await pathology results.   - Repeat colonoscopy in 5-10 years for screening purposes.   -CT abdomen/pelvis- if unremearkable consider GES   - Return to nurse practitioner PRN.  For questions, problems or results please call your physician - Jael Phillips MD at Work:  [phone].  [facility], EMERGENCY ROOM PHONE NUMBER: [phone]  IF A COMPLICATION OR EMERGENCY SITUATION ARISES AND YOU ARE UNABLE TO REACH   YOUR PHYSICIAN - GO DIRECTLY TO THE EMERGENCY ROOM.  Jael Phillips MD  8/11/2022 12:20:52 PM  This report has been verified and signed electronically.  Dear patient,  As a result of recent federal legislation (The Federal Cures Act), you may   receive lab or pathology results from your procedure in your [facility]   account before your physician is able to contact you. Your physician or   their representative will relay the results to you with their   recommendations at their soonest availability.  Thank  you,  PROVATION

## 2022-08-11 NOTE — PROVATION PATIENT INSTRUCTIONS
Discharge Summary/Instructions after an Endoscopic Procedure  Patient Name: Leticia Zelaya  Patient MRN: [id number]  Patient YOB: 1960 Thursday, August 11, 2022  Jael Phillips MD  Dear patient,  As a result of recent federal legislation (The Federal Cures Act), you may   receive lab or pathology results from your procedure in your [facility]   account before your physician is able to contact you. Your physician or   their representative will relay the results to you with their   recommendations at their soonest availability.  Thank you,  RESTRICTIONS:  During your procedure today, you received medications for sedation.  These   medications may affect your judgment, balance and coordination.  Therefore,   for 24 hours, you have the following restrictions:   - DO NOT drive a car, operate machinery, make legal/financial decisions,   sign important papers or drink alcohol.    ACTIVITY:  Today: no heavy lifting, straining or running due to procedural   sedation/anesthesia.  The following day: return to full activity including work.  DIET:  Eat and drink normally unless instructed otherwise.     TREATMENT FOR COMMON SIDE EFFECTS:  - Mild abdominal pain, nausea, belching, bloating or excessive gas:  rest,   eat lightly and use a heating pad.  - Sore Throat: treat with throat lozenges and/or gargle with warm salt   water.  - Because air was used during the procedure, expelling large amounts of air   from your rectum or belching is normal.  - If a bowel prep was taken, you may not have a bowel movement for 1-3 days.    This is normal.  SYMPTOMS TO WATCH FOR AND REPORT TO YOUR PHYSICIAN:  1. Abdominal pain or bloating, other than gas cramps.  2. Chest pain.  3. Back pain.  4. Signs of infection such as: chills or fever occurring within 24 hours   after the procedure.  5. Rectal bleeding, which would show as bright red, maroon, or black stools.   (A tablespoon of blood from the rectum is not serious,  especially if   hemorrhoids are present.)  6. Vomiting.  7. Weakness or dizziness.  GO DIRECTLY TO THE NEAREST EMERGENCY ROOM IF YOU HAVE ANY OF THE FOLLOWING:      Difficulty breathing              Chills and/or fever over 101 F   Persistent vomiting and/or vomiting blood   Severe abdominal pain   Severe chest pain   Black, tarry stools   Bleeding- more than one tablespoon   Any other symptom or condition that you feel may need urgent attention  Your doctor recommends these additional instructions:  If any biopsies were taken, your doctors clinic will contact you in 1 to 2   weeks with any results.  - Await pathology results.   - Discharge patient to home (with escort).   - Patient has a contact number available for emergencies.  The signs and   symptoms of potential delayed complications were discussed with the   patient.  Return to normal activities tomorrow.  Written discharge   instructions were provided to the patient.   - Resume previous diet.   - Continue present medications.   - Await pathology results.   -Begin daily PPI  -Avoid NSAIDs  -Schedule CT abdomen/pelvis- if unremarkable GES  -Anti-emetics as needed  For questions, problems or results please call your physician - Jael Phillips MD at Work:  [phone].  [facility], EMERGENCY ROOM PHONE NUMBER: [phone]  IF A COMPLICATION OR EMERGENCY SITUATION ARISES AND YOU ARE UNABLE TO REACH   YOUR PHYSICIAN - GO DIRECTLY TO THE EMERGENCY ROOM.  Jael Phillips MD  8/11/2022 12:56:33 PM  This report has been verified and signed electronically.  Dear patient,  As a result of recent federal legislation (The Federal Cures Act), you may   receive lab or pathology results from your procedure in your [facility]   account before your physician is able to contact you. Your physician or   their representative will relay the results to you with their   recommendations at their soonest availability.  Thank you,  PROVATION

## 2022-08-15 ENCOUNTER — HOSPITAL ENCOUNTER (OUTPATIENT)
Dept: RADIOLOGY | Facility: HOSPITAL | Age: 62
Discharge: HOME | End: 2022-08-15
Attending: INTERNAL MEDICINE
Payer: COMMERCIAL

## 2022-08-15 DIAGNOSIS — R11.2 NAUSEA & VOMITING: ICD-10-CM

## 2022-08-15 PROCEDURE — 74177 CT ABDOMEN PELVIS WITH CONTRAST: ICD-10-PCS | Mod: 26,,, | Performed by: RADIOLOGY

## 2022-08-15 PROCEDURE — 25500020 PHARM REV CODE 255

## 2022-08-15 PROCEDURE — A9698 NON-RAD CONTRAST MATERIALNOC: HCPCS

## 2022-08-15 PROCEDURE — 74177 CT ABD & PELVIS W/CONTRAST: CPT | Mod: 26,,, | Performed by: RADIOLOGY

## 2022-08-15 PROCEDURE — 74177 CT ABD & PELVIS W/CONTRAST: CPT | Mod: TC

## 2022-08-15 RX ADMIN — IOHEXOL 1000 ML: 9 SOLUTION ORAL at 03:08

## 2022-08-15 RX ADMIN — IOHEXOL 100 ML: 350 INJECTION, SOLUTION INTRAVENOUS at 03:08

## 2022-08-17 LAB
FINAL PATHOLOGIC DIAGNOSIS: NORMAL
GROSS: NORMAL
Lab: NORMAL
SUPPLEMENTAL DIAGNOSIS: NORMAL

## 2022-08-18 DIAGNOSIS — E27.8 ADRENAL NODULE: Primary | ICD-10-CM

## 2022-08-20 ENCOUNTER — PATIENT MESSAGE (OUTPATIENT)
Dept: GASTROENTEROLOGY | Facility: CLINIC | Age: 62
End: 2022-08-20
Payer: COMMERCIAL

## 2022-08-23 DIAGNOSIS — R19.7 DIARRHEA, UNSPECIFIED TYPE: Primary | ICD-10-CM

## 2022-08-24 ENCOUNTER — PATIENT MESSAGE (OUTPATIENT)
Dept: GASTROENTEROLOGY | Facility: CLINIC | Age: 62
End: 2022-08-24
Payer: COMMERCIAL

## 2022-09-09 ENCOUNTER — LAB VISIT (OUTPATIENT)
Dept: LAB | Facility: HOSPITAL | Age: 62
End: 2022-09-09
Attending: STUDENT IN AN ORGANIZED HEALTH CARE EDUCATION/TRAINING PROGRAM
Payer: COMMERCIAL

## 2022-09-09 DIAGNOSIS — R19.7 DIARRHEA, UNSPECIFIED TYPE: ICD-10-CM

## 2022-09-09 LAB — IGA SERPL-MCNC: 106 MG/DL (ref 40–350)

## 2022-09-09 PROCEDURE — 36415 COLL VENOUS BLD VENIPUNCTURE: CPT | Mod: PO | Performed by: INTERNAL MEDICINE

## 2022-09-09 PROCEDURE — 83516 IMMUNOASSAY NONANTIBODY: CPT | Performed by: INTERNAL MEDICINE

## 2022-09-09 PROCEDURE — 82784 ASSAY IGA/IGD/IGG/IGM EACH: CPT | Performed by: INTERNAL MEDICINE

## 2022-09-14 LAB — TTG IGA SER-ACNC: 4 UNITS

## 2022-10-17 ENCOUNTER — PATIENT MESSAGE (OUTPATIENT)
Dept: FAMILY MEDICINE | Facility: CLINIC | Age: 62
End: 2022-10-17
Payer: COMMERCIAL

## 2022-10-17 DIAGNOSIS — R03.0 ELEVATED BLOOD PRESSURE READING: Primary | ICD-10-CM

## 2022-10-17 RX ORDER — TRIAMTERENE/HYDROCHLOROTHIAZID 37.5-25 MG
1 TABLET ORAL DAILY
Qty: 90 TABLET | Refills: 4 | Status: SHIPPED | OUTPATIENT
Start: 2022-10-17 | End: 2023-12-26

## 2022-12-29 ENCOUNTER — LAB VISIT (OUTPATIENT)
Dept: LAB | Facility: HOSPITAL | Age: 62
End: 2022-12-29
Attending: STUDENT IN AN ORGANIZED HEALTH CARE EDUCATION/TRAINING PROGRAM
Payer: COMMERCIAL

## 2022-12-29 ENCOUNTER — OFFICE VISIT (OUTPATIENT)
Dept: FAMILY MEDICINE | Facility: CLINIC | Age: 62
End: 2022-12-29
Payer: COMMERCIAL

## 2022-12-29 VITALS
HEART RATE: 57 BPM | WEIGHT: 189.38 LBS | OXYGEN SATURATION: 96 % | HEIGHT: 68 IN | DIASTOLIC BLOOD PRESSURE: 70 MMHG | SYSTOLIC BLOOD PRESSURE: 124 MMHG | BODY MASS INDEX: 28.7 KG/M2 | RESPIRATION RATE: 18 BRPM

## 2022-12-29 DIAGNOSIS — Z00.00 ROUTINE GENERAL MEDICAL EXAMINATION AT A HEALTH CARE FACILITY: Primary | ICD-10-CM

## 2022-12-29 DIAGNOSIS — Z12.31 ENCOUNTER FOR SCREENING MAMMOGRAM FOR MALIGNANT NEOPLASM OF BREAST: ICD-10-CM

## 2022-12-29 DIAGNOSIS — Z78.0 ASYMPTOMATIC MENOPAUSAL STATE: ICD-10-CM

## 2022-12-29 DIAGNOSIS — K29.70 GASTRITIS, PRESENCE OF BLEEDING UNSPECIFIED, UNSPECIFIED CHRONICITY, UNSPECIFIED GASTRITIS TYPE: ICD-10-CM

## 2022-12-29 DIAGNOSIS — R07.81 RIB PAIN: ICD-10-CM

## 2022-12-29 LAB
ALBUMIN SERPL BCP-MCNC: 3.8 G/DL (ref 3.5–5.2)
ALP SERPL-CCNC: 104 U/L (ref 55–135)
ALT SERPL W/O P-5'-P-CCNC: 59 U/L (ref 10–44)
ANION GAP SERPL CALC-SCNC: 11 MMOL/L (ref 8–16)
AST SERPL-CCNC: 48 U/L (ref 10–40)
BILIRUB SERPL-MCNC: 0.3 MG/DL (ref 0.1–1)
BUN SERPL-MCNC: 15 MG/DL (ref 8–23)
CALCIUM SERPL-MCNC: 9.4 MG/DL (ref 8.7–10.5)
CHLORIDE SERPL-SCNC: 102 MMOL/L (ref 95–110)
CO2 SERPL-SCNC: 28 MMOL/L (ref 23–29)
CREAT SERPL-MCNC: 0.8 MG/DL (ref 0.5–1.4)
EST. GFR  (NO RACE VARIABLE): >60 ML/MIN/1.73 M^2
GLUCOSE SERPL-MCNC: 74 MG/DL (ref 70–110)
POTASSIUM SERPL-SCNC: 3.3 MMOL/L (ref 3.5–5.1)
PROT SERPL-MCNC: 7.1 G/DL (ref 6–8.4)
SODIUM SERPL-SCNC: 141 MMOL/L (ref 136–145)

## 2022-12-29 PROCEDURE — 3074F PR MOST RECENT SYSTOLIC BLOOD PRESSURE < 130 MM HG: ICD-10-PCS | Mod: CPTII,S$GLB,, | Performed by: STUDENT IN AN ORGANIZED HEALTH CARE EDUCATION/TRAINING PROGRAM

## 2022-12-29 PROCEDURE — 3008F PR BODY MASS INDEX (BMI) DOCUMENTED: ICD-10-PCS | Mod: CPTII,S$GLB,, | Performed by: STUDENT IN AN ORGANIZED HEALTH CARE EDUCATION/TRAINING PROGRAM

## 2022-12-29 PROCEDURE — 1159F PR MEDICATION LIST DOCUMENTED IN MEDICAL RECORD: ICD-10-PCS | Mod: CPTII,S$GLB,, | Performed by: STUDENT IN AN ORGANIZED HEALTH CARE EDUCATION/TRAINING PROGRAM

## 2022-12-29 PROCEDURE — 80053 COMPREHEN METABOLIC PANEL: CPT | Performed by: STUDENT IN AN ORGANIZED HEALTH CARE EDUCATION/TRAINING PROGRAM

## 2022-12-29 PROCEDURE — 90686 FLU VACCINE (QUAD) GREATER THAN OR EQUAL TO 3YO PRESERVATIVE FREE IM: ICD-10-PCS | Mod: S$GLB,,, | Performed by: STUDENT IN AN ORGANIZED HEALTH CARE EDUCATION/TRAINING PROGRAM

## 2022-12-29 PROCEDURE — 3078F PR MOST RECENT DIASTOLIC BLOOD PRESSURE < 80 MM HG: ICD-10-PCS | Mod: CPTII,S$GLB,, | Performed by: STUDENT IN AN ORGANIZED HEALTH CARE EDUCATION/TRAINING PROGRAM

## 2022-12-29 PROCEDURE — 99214 PR OFFICE/OUTPT VISIT, EST, LEVL IV, 30-39 MIN: ICD-10-PCS | Mod: 25,S$GLB,, | Performed by: STUDENT IN AN ORGANIZED HEALTH CARE EDUCATION/TRAINING PROGRAM

## 2022-12-29 PROCEDURE — 3074F SYST BP LT 130 MM HG: CPT | Mod: CPTII,S$GLB,, | Performed by: STUDENT IN AN ORGANIZED HEALTH CARE EDUCATION/TRAINING PROGRAM

## 2022-12-29 PROCEDURE — 3078F DIAST BP <80 MM HG: CPT | Mod: CPTII,S$GLB,, | Performed by: STUDENT IN AN ORGANIZED HEALTH CARE EDUCATION/TRAINING PROGRAM

## 2022-12-29 PROCEDURE — 90686 IIV4 VACC NO PRSV 0.5 ML IM: CPT | Mod: S$GLB,,, | Performed by: STUDENT IN AN ORGANIZED HEALTH CARE EDUCATION/TRAINING PROGRAM

## 2022-12-29 PROCEDURE — 90471 IMMUNIZATION ADMIN: CPT | Mod: S$GLB,,, | Performed by: STUDENT IN AN ORGANIZED HEALTH CARE EDUCATION/TRAINING PROGRAM

## 2022-12-29 PROCEDURE — 3008F BODY MASS INDEX DOCD: CPT | Mod: CPTII,S$GLB,, | Performed by: STUDENT IN AN ORGANIZED HEALTH CARE EDUCATION/TRAINING PROGRAM

## 2022-12-29 PROCEDURE — 90471 FLU VACCINE (QUAD) GREATER THAN OR EQUAL TO 3YO PRESERVATIVE FREE IM: ICD-10-PCS | Mod: S$GLB,,, | Performed by: STUDENT IN AN ORGANIZED HEALTH CARE EDUCATION/TRAINING PROGRAM

## 2022-12-29 PROCEDURE — 36415 COLL VENOUS BLD VENIPUNCTURE: CPT | Mod: PO | Performed by: STUDENT IN AN ORGANIZED HEALTH CARE EDUCATION/TRAINING PROGRAM

## 2022-12-29 PROCEDURE — 1159F MED LIST DOCD IN RCRD: CPT | Mod: CPTII,S$GLB,, | Performed by: STUDENT IN AN ORGANIZED HEALTH CARE EDUCATION/TRAINING PROGRAM

## 2022-12-29 PROCEDURE — 99214 OFFICE O/P EST MOD 30 MIN: CPT | Mod: 25,S$GLB,, | Performed by: STUDENT IN AN ORGANIZED HEALTH CARE EDUCATION/TRAINING PROGRAM

## 2022-12-29 PROCEDURE — 99999 PR PBB SHADOW E&M-EST. PATIENT-LVL V: CPT | Mod: PBBFAC,,, | Performed by: STUDENT IN AN ORGANIZED HEALTH CARE EDUCATION/TRAINING PROGRAM

## 2022-12-29 PROCEDURE — 99999 PR PBB SHADOW E&M-EST. PATIENT-LVL V: ICD-10-PCS | Mod: PBBFAC,,, | Performed by: STUDENT IN AN ORGANIZED HEALTH CARE EDUCATION/TRAINING PROGRAM

## 2022-12-29 RX ORDER — FLUOXETINE 20 MG/5ML
SOLUTION ORAL
COMMUNITY
Start: 2022-12-15

## 2022-12-29 RX ORDER — CYCLOBENZAPRINE HCL 5 MG
TABLET ORAL
Qty: 30 TABLET | Refills: 1 | Status: SHIPPED | OUTPATIENT
Start: 2022-12-29 | End: 2023-10-09 | Stop reason: SDUPTHER

## 2022-12-29 RX ORDER — OMEPRAZOLE 40 MG/1
40 CAPSULE, DELAYED RELEASE ORAL DAILY
Qty: 90 CAPSULE | Refills: 1 | Status: SHIPPED | OUTPATIENT
Start: 2022-12-29 | End: 2023-10-09 | Stop reason: SDUPTHER

## 2022-12-29 NOTE — PROGRESS NOTES
"[facility] MEDICINE CLINIC NOTE    Patient Name: Leticia Zelaya  YOB: 1960    PRESENTING HISTORY     History of Present Illness:  Ms. Leticia Zelaya is a 62 y.o. female here for check up.     Her N/V/D has improved with social changes and reducing her stress levels.   Workup was essentially unrevealing.   Presumptive diagnosis is that this was related to extreme stressors.     She is cutting back at work and working towards shelter.   She is seeing therapist and psychiatrist regularly.   Medications were adjusted as trintellix may have been making her sick.   She is exercising regularly (although this is causing some back and joint issues for which she requests refill of muscle relaxant).     On PPI, asks for duration. We will do 6 months. No PUD but gastritis.     ROS      OBJECTIVE:   Vital Signs:  Vitals:    12/29/22 1311   BP: 124/70   Pulse: (!) 57   Resp: 18   SpO2: 96%   Weight: 85.9 kg (189 lb 6 oz)   Height: 5' 8" (1.727 m)         Physical Exam  Vitals and nursing note reviewed.   Constitutional:       General: She is not in acute distress.     Appearance: She is not toxic-appearing or diaphoretic.   HENT:      Head: Normocephalic and atraumatic.      Right Ear: External ear normal.      Left Ear: External ear normal.   Eyes:      General: No scleral icterus.     Conjunctiva/sclera: Conjunctivae normal.      Pupils: Pupils are equal, round, and reactive to light.   Neck:      Thyroid: No thyromegaly.      Vascular: No carotid bruit.   Cardiovascular:      Rate and Rhythm: Normal rate and regular rhythm.      Heart sounds: Normal heart sounds. No murmur heard.  Pulmonary:      Effort: Pulmonary effort is normal. No respiratory distress.      Breath sounds: Normal breath sounds. No wheezing or rales.   Musculoskeletal:         General: No tenderness or deformity. Normal range of motion.      Cervical back: Normal range of motion and neck supple.      Right lower leg: No edema.     "  Left lower leg: No edema.   Lymphadenopathy:      Cervical: No cervical adenopathy.   Skin:     General: Skin is warm and dry.      Findings: No erythema or rash.   Neurological:      Mental Status: She is alert and oriented to person, place, and time.      Gait: Gait is intact.   Psychiatric:         Mood and Affect: Mood and affect normal.         Cognition and Memory: Memory normal.         Judgment: Judgment normal.       ASSESSMENT & PLAN:     Routine general medical examination at a health care facility    Asymptomatic menopausal state  -     DXA Bone Density Spine And Hip; Future; Expected date: 12/29/2022    Gastritis, presence of bleeding unspecified, unspecified chronicity, unspecified gastritis type  -     omeprazole (PRILOSEC) 40 MG capsule; Take 1 capsule (40 mg total) by mouth once daily.  Dispense: 90 capsule; Refill: 1  -     Comprehensive Metabolic Panel; Future; Expected date: 12/29/2022    Rib pain  -     cyclobenzaprine (FLEXERIL) 5 MG tablet; TAKE 1 TABLET(5 MG) BY MOUTH THREE TIMES DAILY AS NEEDED FOR MUSCLE SPASMS  Dispense: 30 tablet; Refill: 1    Encounter for screening mammogram for malignant neoplasm of breast  -     Mammo Digital Screening [facility]/ [place]; Future; Expected date: 12/29/2023          Josh Aj MD   Internal Medicine

## 2023-01-10 ENCOUNTER — HOSPITAL ENCOUNTER (OUTPATIENT)
Dept: RADIOLOGY | Facility: CLINIC | Age: 63
Discharge: HOME | End: 2023-01-10
Attending: STUDENT IN AN ORGANIZED HEALTH CARE EDUCATION/TRAINING PROGRAM
Payer: COMMERCIAL

## 2023-01-10 DIAGNOSIS — Z78.0 ASYMPTOMATIC MENOPAUSAL STATE: ICD-10-CM

## 2023-01-10 PROCEDURE — 77080 DXA BONE DENSITY AXIAL: CPT | Mod: TC,PO

## 2023-01-10 PROCEDURE — 77080 DEXA BONE DENSITY SPINE HIP: ICD-10-PCS | Mod: 26,,, | Performed by: RADIOLOGY

## 2023-01-10 PROCEDURE — 77080 DXA BONE DENSITY AXIAL: CPT | Mod: 26,,, | Performed by: RADIOLOGY

## 2023-04-27 ENCOUNTER — PATIENT MESSAGE (OUTPATIENT)
Dept: FAMILY MEDICINE | Facility: CLINIC | Age: 63
End: 2023-04-27
Payer: COMMERCIAL

## 2023-04-27 DIAGNOSIS — M54.41 CHRONIC RIGHT-SIDED LOW BACK PAIN WITH RIGHT-SIDED SCIATICA: Primary | ICD-10-CM

## 2023-04-27 DIAGNOSIS — G89.29 CHRONIC RIGHT-SIDED LOW BACK PAIN WITH RIGHT-SIDED SCIATICA: Primary | ICD-10-CM

## 2023-04-28 NOTE — TELEPHONE ENCOUNTER
Call placed to patient for notification of back and spine referral/assist with scheduling appointment. No answer received. Left message requesting return call to office. Also sent [facility] portal message for notification as this is the initial point of contact regarding this matter.         MD Madai Ramirez Staff 15 hours ago (5:05 PM)       Back and Spine referral

## 2023-05-05 ENCOUNTER — TELEPHONE (OUTPATIENT)
Dept: PAIN MEDICINE | Facility: CLINIC | Age: 63
End: 2023-05-05
Payer: COMMERCIAL

## 2023-05-05 ENCOUNTER — OFFICE VISIT (OUTPATIENT)
Dept: SPINE | Facility: CLINIC | Age: 63
End: 2023-05-05
Payer: COMMERCIAL

## 2023-05-05 VITALS — HEIGHT: 68 IN | WEIGHT: 189.38 LBS | BODY MASS INDEX: 28.7 KG/M2

## 2023-05-05 DIAGNOSIS — M54.16 LUMBAR RADICULOPATHY: Primary | ICD-10-CM

## 2023-05-05 DIAGNOSIS — G89.29 CHRONIC RIGHT-SIDED LOW BACK PAIN WITH RIGHT-SIDED SCIATICA: ICD-10-CM

## 2023-05-05 DIAGNOSIS — M54.41 CHRONIC RIGHT-SIDED LOW BACK PAIN WITH RIGHT-SIDED SCIATICA: ICD-10-CM

## 2023-05-05 PROCEDURE — 3288F PR FALLS RISK ASSESSMENT DOCUMENTED: ICD-10-PCS | Mod: CPTII,S$GLB,, | Performed by: PHYSICAL MEDICINE & REHABILITATION

## 2023-05-05 PROCEDURE — 1160F PR REVIEW ALL MEDS BY PRESCRIBER/CLIN PHARMACIST DOCUMENTED: ICD-10-PCS | Mod: CPTII,S$GLB,, | Performed by: PHYSICAL MEDICINE & REHABILITATION

## 2023-05-05 PROCEDURE — 1101F PT FALLS ASSESS-DOCD LE1/YR: CPT | Mod: CPTII,S$GLB,, | Performed by: PHYSICAL MEDICINE & REHABILITATION

## 2023-05-05 PROCEDURE — 1101F PR PT FALLS ASSESS DOC 0-1 FALLS W/OUT INJ PAST YR: ICD-10-PCS | Mod: CPTII,S$GLB,, | Performed by: PHYSICAL MEDICINE & REHABILITATION

## 2023-05-05 PROCEDURE — 1159F PR MEDICATION LIST DOCUMENTED IN MEDICAL RECORD: ICD-10-PCS | Mod: CPTII,S$GLB,, | Performed by: PHYSICAL MEDICINE & REHABILITATION

## 2023-05-05 PROCEDURE — 3008F PR BODY MASS INDEX (BMI) DOCUMENTED: ICD-10-PCS | Mod: CPTII,S$GLB,, | Performed by: PHYSICAL MEDICINE & REHABILITATION

## 2023-05-05 PROCEDURE — 1160F RVW MEDS BY RX/DR IN RCRD: CPT | Mod: CPTII,S$GLB,, | Performed by: PHYSICAL MEDICINE & REHABILITATION

## 2023-05-05 PROCEDURE — 99204 OFFICE O/P NEW MOD 45 MIN: CPT | Mod: S$GLB,,, | Performed by: PHYSICAL MEDICINE & REHABILITATION

## 2023-05-05 PROCEDURE — 1159F MED LIST DOCD IN RCRD: CPT | Mod: CPTII,S$GLB,, | Performed by: PHYSICAL MEDICINE & REHABILITATION

## 2023-05-05 PROCEDURE — 3008F BODY MASS INDEX DOCD: CPT | Mod: CPTII,S$GLB,, | Performed by: PHYSICAL MEDICINE & REHABILITATION

## 2023-05-05 PROCEDURE — 3288F FALL RISK ASSESSMENT DOCD: CPT | Mod: CPTII,S$GLB,, | Performed by: PHYSICAL MEDICINE & REHABILITATION

## 2023-05-05 PROCEDURE — 99204 PR OFFICE/OUTPT VISIT, NEW, LEVL IV, 45-59 MIN: ICD-10-PCS | Mod: S$GLB,,, | Performed by: PHYSICAL MEDICINE & REHABILITATION

## 2023-05-05 RX ORDER — MIRTAZAPINE 15 MG/1
15 TABLET, FILM COATED ORAL NIGHTLY
COMMUNITY

## 2023-05-05 RX ORDER — HYDROCODONE BITARTRATE AND ACETAMINOPHEN 5; 325 MG/1; MG/1
1 TABLET ORAL NIGHTLY PRN
Qty: 28 TABLET | Refills: 0 | Status: SHIPPED | OUTPATIENT
Start: 2023-05-05

## 2023-05-05 NOTE — TELEPHONE ENCOUNTER
----- Message from Fernando Conrad MD sent at 5/5/2023  8:20 AM CDT -----  Please schedule for interlaminar injection L5-S1

## 2023-05-05 NOTE — PROGRESS NOTES
SUBJECTIVE:    Patient ID: Leticia Zelaya is a 63 y.o. female.    Chief Complaint: Low-back Pain    This is a 63-year-old woman who sees Dr. Aj for her primary care.  Other than hypertension she denies any chronic major medical problems.  No cancer history.  Has a long history of intermittent low back pain associated with right leg pain.  In the past she is tried physical therapy and massage therapy with some benefit.  She is also had epidural steroid injections in the remote past with benefit.  Presents with a 1 month history of spontaneously occurring right-sided low back pain at the lumbosacral junction that radiates down the anterolateral portion of the right leg to the anterior portion of the leg below the knee stopping just below the knee itself.  No bowel or bladder dysfunction fever chills sweats or unexpected weight loss.  She is been taking BC powder and doing stretches and massage treatment but her symptoms are not resolving.  She is interested in an epidural steroid injection.  I reviewed a CT of the abdomen and pelvis done 08/15/2022 which shows degenerative disc disease that is worst at L3-4 and L2-3.  Current pain level is 4/10        Past Medical History:   Diagnosis Date    Anemia     Anxiety     Colon polyp     Depression     Depression     Hypertension     PONV (postoperative nausea and vomiting)     PTSD (post-traumatic stress disorder)      Social History     Socioeconomic History    Marital status:    Tobacco Use    Smoking status: Never    Smokeless tobacco: Never   Substance and Sexual Activity    Alcohol use: Yes     Alcohol/week: 3.0 standard drinks     Types: 3 Glasses of wine per week     Comment: rare    Drug use: Not Currently    Sexual activity: Not Currently     Partners: Male     Social Determinants of Health     Financial Resource Strain: Low Risk     Difficulty of Paying Living Expenses: Not very hard   Food Insecurity: No Food Insecurity    Worried About Running  "Out of Food in the Last Year: Never true    Ran Out of Food in the Last Year: Never true   Transportation Needs: No Transportation Needs    Lack of Transportation (Medical): No    Lack of Transportation (Non-Medical): No   Physical Activity: Insufficiently Active    Days of Exercise per Week: 2 days    Minutes of Exercise per Session: 30 min   Stress: Stress Concern Present    Feeling of Stress : Very much   Social Connections: Unknown    Frequency of Communication with Friends and Family: Three times a week    Frequency of Social Gatherings with Friends and Family: Once a week    Active Member of Clubs or Organizations: No    Attends Club or Organization Meetings: Never    Marital Status:    Housing Stability: Low Risk     Unable to Pay for Housing in the Last Year: No    Number of Places Lived in the Last Year: 1    Unstable Housing in the Last Year: No     Past Surgical History:   Procedure Laterality Date    APPENDECTOMY      BREAST BIOPSY N/A     3 fibroadenomas removed.     BREAST FIBROADENOMA SURGERY Bilateral 1990's    3     CHOLECYSTECTOMY      COLONOSCOPY      COLONOSCOPY N/A 8/11/2022    Procedure: COLONOSCOPY;  Surgeon: Jael Phillips MD;  Location: [facility];  Service: Endoscopy;  Laterality: N/A;    ESOPHAGOGASTRODUODENOSCOPY N/A 8/11/2022    Procedure: EGD (ESOPHAGOGASTRODUODENOSCOPY);  Surgeon: Jael Phillips MD;  Location: [facility];  Service: Endoscopy;  Laterality: N/A;    SHOULDER SURGERY Right     TONSILLECTOMY       Family History   Problem Relation Age of Onset    Hypertension Father     Colon cancer Maternal Uncle 75    Heart disease Neg Hx     Heart attack Neg Hx     Breast cancer Neg Hx     Ovarian cancer Neg Hx     Colon polyps Neg Hx     Crohn's disease Neg Hx     Ulcerative colitis Neg Hx      Vitals:    05/05/23 0804   Weight: 85.9 kg (189 lb 6 oz)   Height: 5' 8" (1.727 m)       Review of Systems   Constitutional:  Negative for chills, diaphoresis, fatigue, fever and " unexpected weight change.   HENT:  Negative for trouble swallowing.    Eyes:  Negative for visual disturbance.   Respiratory:  Negative for shortness of breath.    Cardiovascular:  Negative for chest pain.   Gastrointestinal:  Negative for abdominal pain, constipation, nausea and vomiting.   Genitourinary:  Negative for difficulty urinating.   Musculoskeletal:  Negative for arthralgias, back pain, gait problem, joint swelling, myalgias, neck pain and neck stiffness.   Neurological:  Negative for dizziness, speech difficulty, weakness, light-headedness, numbness and headaches.        Objective:      Physical Exam  Neurological:      Mental Status: She is alert and oriented to person, place, and time.      Comments: She is awake and in no acute distress  No point tenderness or palpable masses about the lumbar spine  Forward flexion of the lumbar spine is normal and painless.  Extension causes mild pain on the right at the lumbosacral junction  She can heel and toe walk normally  Reflexes- +1-+2 reflexes at the following:   C5-Biceps   C6-Brachioradialis   C7-Triceps   L3/4-Patellar   S1-Achilles   Natalia sign negative bilaterally  Strength testing- 5/5 strength in the following muscle groups:  C5-Elbow flexion  C6-Wrist extension  C7-Elbow extension  C8-Finger flexion  T1-Finger abduction  L2-Hip flexion  L3-Knee extension  L4-Ankle dorsiflexion  L5-Great toe extension  S1-Ankle plantar flexion       Straight leg raise negative bilaterally  JULIANA test negative bilaterally           Assessment:       1. Chronic right-sided low back pain with right-sided sciatica           Plan:     She has a nonfocal neurological examination and no historical red flags.  I suspect she has low back pain on basis of degenerative disc disease and facet arthropathy.  Has pain with facet loading.  She has symptoms most consistent with right L5 radiculitis.  No evidence of nerve root dysfunction.  Not improving to her satisfaction with  stretching massage therapy and anti-inflammatory medications.  I am recommending interlaminar injection at L5-S1.  She is requesting low-dose hydrocodone to take as needed for pain until we can get her scheduled.  This has helped in the past.  I cautioned her against long-term use of that medication.  She can follow up with me after the procedure      Chronic right-sided low back pain with right-sided sciatica  -     Ambulatory referral/consult to Back & Spine Clinic  -     HYDROcodone-acetaminophen (NORCO) 5-325 mg per tablet; Take 1 tablet by mouth nightly as needed for Pain.  Dispense: 28 tablet; Refill: 0

## 2023-05-22 ENCOUNTER — OFFICE VISIT (OUTPATIENT)
Dept: OPHTHALMOLOGY | Facility: CLINIC | Age: 63
End: 2023-05-22
Payer: COMMERCIAL

## 2023-05-22 DIAGNOSIS — H04.123 DRY EYE SYNDROME, BILATERAL: Primary | ICD-10-CM

## 2023-05-22 DIAGNOSIS — Z96.1 PSEUDOPHAKIA, BOTH EYES: ICD-10-CM

## 2023-05-22 PROCEDURE — 3288F PR FALLS RISK ASSESSMENT DOCUMENTED: ICD-10-PCS | Mod: CPTII,S$GLB,, | Performed by: OPHTHALMOLOGY

## 2023-05-22 PROCEDURE — 3288F FALL RISK ASSESSMENT DOCD: CPT | Mod: CPTII,S$GLB,, | Performed by: OPHTHALMOLOGY

## 2023-05-22 PROCEDURE — 1160F RVW MEDS BY RX/DR IN RCRD: CPT | Mod: CPTII,S$GLB,, | Performed by: OPHTHALMOLOGY

## 2023-05-22 PROCEDURE — 1159F MED LIST DOCD IN RCRD: CPT | Mod: CPTII,S$GLB,, | Performed by: OPHTHALMOLOGY

## 2023-05-22 PROCEDURE — 99204 PR OFFICE/OUTPT VISIT, NEW, LEVL IV, 45-59 MIN: ICD-10-PCS | Mod: S$GLB,,, | Performed by: OPHTHALMOLOGY

## 2023-05-22 PROCEDURE — 99999 PR PBB SHADOW E&M-EST. PATIENT-LVL III: CPT | Mod: PBBFAC,,, | Performed by: OPHTHALMOLOGY

## 2023-05-22 PROCEDURE — 99204 OFFICE O/P NEW MOD 45 MIN: CPT | Mod: S$GLB,,, | Performed by: OPHTHALMOLOGY

## 2023-05-22 PROCEDURE — 92015 PR REFRACTION: ICD-10-PCS | Mod: S$GLB,,, | Performed by: OPHTHALMOLOGY

## 2023-05-22 PROCEDURE — 1160F PR REVIEW ALL MEDS BY PRESCRIBER/CLIN PHARMACIST DOCUMENTED: ICD-10-PCS | Mod: CPTII,S$GLB,, | Performed by: OPHTHALMOLOGY

## 2023-05-22 PROCEDURE — 99999 PR PBB SHADOW E&M-EST. PATIENT-LVL III: ICD-10-PCS | Mod: PBBFAC,,, | Performed by: OPHTHALMOLOGY

## 2023-05-22 PROCEDURE — 92015 DETERMINE REFRACTIVE STATE: CPT | Mod: S$GLB,,, | Performed by: OPHTHALMOLOGY

## 2023-05-22 PROCEDURE — 1159F PR MEDICATION LIST DOCUMENTED IN MEDICAL RECORD: ICD-10-PCS | Mod: CPTII,S$GLB,, | Performed by: OPHTHALMOLOGY

## 2023-05-22 PROCEDURE — 1101F PR PT FALLS ASSESS DOC 0-1 FALLS W/OUT INJ PAST YR: ICD-10-PCS | Mod: CPTII,S$GLB,, | Performed by: OPHTHALMOLOGY

## 2023-05-22 PROCEDURE — 1101F PT FALLS ASSESS-DOCD LE1/YR: CPT | Mod: CPTII,S$GLB,, | Performed by: OPHTHALMOLOGY

## 2023-05-22 NOTE — PROGRESS NOTES
HPI    Pt presents for yearly exam     Pt states she had cat sx 2 years ago, complains of near vision. Family   history of glaucoma, Dad and Paternal side. At last eye exam, no signs of   glaucoma.  States she has multifocals in both eyes as well as yags OU     Refresh PRN OU   Hx of pciol OU- multifocal   Yag cap OU       Last edited by Yaritza Buckley on 5/22/2023  3:16 PM.            Assessment /Plan     For exam results, see Encounter Report.    Dry eye syndrome, bilateral    Pseudophakia, both eyes      1. Dry eye syndrome, bilateral  Mild  Recommend artificial tears PRN    2. Pseudophakia, both eyes  MFIOL OU  Good lens position s/p YAG OU  New glasses Rx today    F/u 1 year, Dr. Camargo routine exam

## 2023-05-22 NOTE — PATIENT INSTRUCTIONS
"What is dry eye?   -- Dry eye happens when your eyes either do not make enough tears or the tears that they make evaporate (go away) too quickly. This causes your eyes to feel dry and irritated. The medical term for dry eye is "keratoconjunctivitis sicca."    What causes dry eye?  -- Many people have dry eye, including about one-third of older adults. A few people with dry eye might have another disease, such as Sjögren's syndrome, diabetes, or Parkinson disease. Some medicines can cause dry eye symptoms or make them worse.    What are the symptoms of dry eye?   -- People have different symptoms but the most common ones are eyes that:  ?Feel dry or burn  ?Look red  Other symptoms can include:  ?Being bothered by light  ?Feeling like something is in your eyes  ?Blurry vision  ?Watery eyes  ?Discomfort wearing contact lenses  In some people the symptoms are worse when it is cold or windy or if they are in a dry environment.    How is dry eye treated? -- Artificial tears or lubricant eye drops are the main treatment for dry eye. They can keep the eye moist and help with the symptoms of dry eye. You can buy artificial tears at the drug store or grocery store without a prescription. They come in liquid, gel, or ointments. Your doctor or nurse will help you decide which form is best for you. It is usually best to avoid eye drops that are meant to reduce redness.    Artificial tears help with the symptoms of dry eye, but they do not cure the condition. They work only as long as you keep using them.  Other things you can do to help improve your symptoms are:  ?Try to blink a lot, especially when you are reading or using the computer. This helps keep your eye moist.  ?Avoid excess air conditioning or heating as much as you can. Also avoid sitting directly in the flow of the cold or hot air.  ?Use a humidifier in your bedroom and any other space where you spend a lot of time.  ?Avoid smoke and smoky air  ?Wear protective " eyewear when you are outside. Glasses that cover more of your face, or have special shields on the sides, can protect your eyes from wind and dry air.    If your dry eye does not get better, your doctor can do more tests and might suggest other treatments. These include prescription eye drops or ointments, special glasses or goggles, oral medicines (pills), or minor surgical procedures.     Adult

## 2023-06-06 ENCOUNTER — HOSPITAL ENCOUNTER (OUTPATIENT)
Facility: AMBULARY SURGERY CENTER | Age: 63
Discharge: HOME | End: 2023-06-06
Attending: ANESTHESIOLOGY | Admitting: ANESTHESIOLOGY
Payer: COMMERCIAL

## 2023-06-06 DIAGNOSIS — M54.16 LUMBAR RADICULITIS: ICD-10-CM

## 2023-06-06 PROCEDURE — 62323 NJX INTERLAMINAR LMBR/SAC: CPT | Performed by: ANESTHESIOLOGY

## 2023-06-06 PROCEDURE — 62323 NJX INTERLAMINAR LMBR/SAC: CPT | Mod: ,,, | Performed by: ANESTHESIOLOGY

## 2023-06-06 PROCEDURE — 62323 PR INJ LUMBAR/SACRAL, W/IMAGING GUIDANCE: ICD-10-PCS | Mod: ,,, | Performed by: ANESTHESIOLOGY

## 2023-06-06 RX ORDER — FENTANYL CITRATE 50 UG/ML
INJECTION, SOLUTION INTRAMUSCULAR; INTRAVENOUS
Status: DISCONTINUED | OUTPATIENT
Start: 2023-06-06 | End: 2023-06-06 | Stop reason: HOSPADM

## 2023-06-06 RX ORDER — MIDAZOLAM HYDROCHLORIDE 1 MG/ML
INJECTION INTRAMUSCULAR; INTRAVENOUS
Status: DISCONTINUED | OUTPATIENT
Start: 2023-06-06 | End: 2023-06-06 | Stop reason: HOSPADM

## 2023-06-06 RX ORDER — LIDOCAINE HYDROCHLORIDE 10 MG/ML
INJECTION, SOLUTION EPIDURAL; INFILTRATION; INTRACAUDAL; PERINEURAL
Status: DISCONTINUED | OUTPATIENT
Start: 2023-06-06 | End: 2023-06-06 | Stop reason: HOSPADM

## 2023-06-06 RX ORDER — DEXAMETHASONE SODIUM PHOSPHATE 10 MG/ML
INJECTION INTRAMUSCULAR; INTRAVENOUS
Status: DISCONTINUED | OUTPATIENT
Start: 2023-06-06 | End: 2023-06-06 | Stop reason: HOSPADM

## 2023-06-06 RX ORDER — SODIUM CHLORIDE, SODIUM LACTATE, POTASSIUM CHLORIDE, CALCIUM CHLORIDE 600; 310; 30; 20 MG/100ML; MG/100ML; MG/100ML; MG/100ML
INJECTION, SOLUTION INTRAVENOUS ONCE AS NEEDED
Status: COMPLETED | OUTPATIENT
Start: 2023-06-06 | End: 2023-06-06

## 2023-06-06 RX ORDER — ONDANSETRON 2 MG/ML
4 INJECTION INTRAMUSCULAR; INTRAVENOUS ONCE
Status: COMPLETED | OUTPATIENT
Start: 2023-06-06 | End: 2023-06-06

## 2023-06-06 RX ORDER — SODIUM CHLORIDE 9 MG/ML
INJECTION, SOLUTION INTRAMUSCULAR; INTRAVENOUS; SUBCUTANEOUS
Status: DISCONTINUED | OUTPATIENT
Start: 2023-06-06 | End: 2023-06-06 | Stop reason: HOSPADM

## 2023-06-06 RX ADMIN — SODIUM CHLORIDE, SODIUM LACTATE, POTASSIUM CHLORIDE, CALCIUM CHLORIDE: 600; 310; 30; 20 INJECTION, SOLUTION INTRAVENOUS at 09:06

## 2023-06-06 RX ADMIN — ONDANSETRON 4 MG: 2 INJECTION INTRAMUSCULAR; INTRAVENOUS at 09:06

## 2023-06-06 NOTE — OP NOTE
PROCEDURE DATE: 6/6/2023    Procedure:   Interlaminar epidural steroid injection at L5-S1 under fluoroscopic guidance.    Diagnosis: lUMBAR radiculitis  pOSTOP DIAGNOSIS: sAME    Physician: Jeff Velasquez M.D.    Medications injected:10 mg dexamethasone with 4 ml of preservative free NaCl    Local anesthetic injected:    Lidocaine 1% 2 ml total    Sedation Medications: RN IV Sedation    Estimated blood loss:  None    Complications:  NOne    Technique:  Time-out taken to identify patient and procedure prior to starting the procedure.  With the patient laying in a prone position, the area was prepped and draped in the usual sterile fashion using ChloraPrep and a fenestrated drape.  After determining the target level with an AP fluoroscopic view, local anesthetic was given using a 25-gauge 1.5 inch needle by raising a wheal and then infiltrating toward the interlaminar entry space.  A 3.5inch 20-gauge Touhy needle was introduced under AP fluoroscopic guidance to the interlaminar space of L5-S1. Once the trajectory was established, the needle was visualized in the lateral view and advanced using loss of resistance technique. Once in the desired position, 1ml contrast was injected to confirm placement and there was no vascular uptake nor intrathecal spread.  The medication was then injected slowly. The patient tolerated the procedure well.      The patient was monitored after the procedure.   They were given post-procedure and discharge instructions to follow at home.  The patient was discharged in a stable condition.

## 2023-06-07 VITALS
SYSTOLIC BLOOD PRESSURE: 133 MMHG | DIASTOLIC BLOOD PRESSURE: 77 MMHG | BODY MASS INDEX: 28.79 KG/M2 | WEIGHT: 189.38 LBS | TEMPERATURE: 99 F | HEART RATE: 56 BPM | OXYGEN SATURATION: 98 % | RESPIRATION RATE: 18 BRPM

## 2023-07-12 ENCOUNTER — OFFICE VISIT (OUTPATIENT)
Dept: SPINE | Facility: CLINIC | Age: 63
End: 2023-07-12
Payer: COMMERCIAL

## 2023-07-12 ENCOUNTER — TELEPHONE (OUTPATIENT)
Dept: PAIN MEDICINE | Facility: CLINIC | Age: 63
End: 2023-07-12
Payer: COMMERCIAL

## 2023-07-12 VITALS — BODY MASS INDEX: 28.7 KG/M2 | HEIGHT: 68 IN | WEIGHT: 189.38 LBS

## 2023-07-12 DIAGNOSIS — M54.16 LUMBAR RADICULOPATHY: Primary | ICD-10-CM

## 2023-07-12 DIAGNOSIS — G89.29 CHRONIC RIGHT-SIDED LOW BACK PAIN WITH RIGHT-SIDED SCIATICA: Primary | ICD-10-CM

## 2023-07-12 DIAGNOSIS — R07.81 RIB PAIN: ICD-10-CM

## 2023-07-12 DIAGNOSIS — M54.41 CHRONIC RIGHT-SIDED LOW BACK PAIN WITH RIGHT-SIDED SCIATICA: Primary | ICD-10-CM

## 2023-07-12 PROCEDURE — 3288F FALL RISK ASSESSMENT DOCD: CPT | Mod: CPTII,S$GLB,, | Performed by: PHYSICAL MEDICINE & REHABILITATION

## 2023-07-12 PROCEDURE — 99213 OFFICE O/P EST LOW 20 MIN: CPT | Mod: S$GLB,,, | Performed by: PHYSICAL MEDICINE & REHABILITATION

## 2023-07-12 PROCEDURE — 1159F PR MEDICATION LIST DOCUMENTED IN MEDICAL RECORD: ICD-10-PCS | Mod: CPTII,S$GLB,, | Performed by: PHYSICAL MEDICINE & REHABILITATION

## 2023-07-12 PROCEDURE — 1160F RVW MEDS BY RX/DR IN RCRD: CPT | Mod: CPTII,S$GLB,, | Performed by: PHYSICAL MEDICINE & REHABILITATION

## 2023-07-12 PROCEDURE — 3288F PR FALLS RISK ASSESSMENT DOCUMENTED: ICD-10-PCS | Mod: CPTII,S$GLB,, | Performed by: PHYSICAL MEDICINE & REHABILITATION

## 2023-07-12 PROCEDURE — 99213 PR OFFICE/OUTPT VISIT, EST, LEVL III, 20-29 MIN: ICD-10-PCS | Mod: S$GLB,,, | Performed by: PHYSICAL MEDICINE & REHABILITATION

## 2023-07-12 PROCEDURE — 1159F MED LIST DOCD IN RCRD: CPT | Mod: CPTII,S$GLB,, | Performed by: PHYSICAL MEDICINE & REHABILITATION

## 2023-07-12 PROCEDURE — 1101F PR PT FALLS ASSESS DOC 0-1 FALLS W/OUT INJ PAST YR: ICD-10-PCS | Mod: CPTII,S$GLB,, | Performed by: PHYSICAL MEDICINE & REHABILITATION

## 2023-07-12 PROCEDURE — 1101F PT FALLS ASSESS-DOCD LE1/YR: CPT | Mod: CPTII,S$GLB,, | Performed by: PHYSICAL MEDICINE & REHABILITATION

## 2023-07-12 PROCEDURE — 3008F PR BODY MASS INDEX (BMI) DOCUMENTED: ICD-10-PCS | Mod: CPTII,S$GLB,, | Performed by: PHYSICAL MEDICINE & REHABILITATION

## 2023-07-12 PROCEDURE — 1160F PR REVIEW ALL MEDS BY PRESCRIBER/CLIN PHARMACIST DOCUMENTED: ICD-10-PCS | Mod: CPTII,S$GLB,, | Performed by: PHYSICAL MEDICINE & REHABILITATION

## 2023-07-12 PROCEDURE — 3008F BODY MASS INDEX DOCD: CPT | Mod: CPTII,S$GLB,, | Performed by: PHYSICAL MEDICINE & REHABILITATION

## 2023-07-12 NOTE — TELEPHONE ENCOUNTER
----- Message from Fernando Conrad MD sent at 7/12/2023  2:49 PM CDT -----  Please schedule for interlaminar injection L5-S1

## 2023-07-12 NOTE — PROGRESS NOTES
SUBJECTIVE:    Patient ID: Leticia Zelaya is a 63 y.o. female.    Chief Complaint: Hip Pain    She is here for follow-up status post interlaminar injection L5-S1 on 06/06/2023 with Dr. Velasquez.  Good response to that procedure.  She describes about 25% improvement in her back and right leg symptoms.  Having said that she is not been as active as she typically is.  She describes her low back pain as more of a nuisance.  The real issue is radiating discomfort down her right leg generally in the posterolateral portion of the leg to the knee but occasionally radiates below the knee and into the foot.  She also describes chronic right lateral chest wall pain.  She is had 2 rib fractures on the right side in the past 1 was in 2013 in the more recent 1 in 2018.  I have x-rays of the ribs from 2018 which show a mildly displaced right 5th rib fracture.  I do not see any additional fractures and I do not have any earlier films.  Pain level is 2/10 overall.  She is taking gabapentin and Flexeril for pain.  She has no new or progressive        Past Medical History:   Diagnosis Date    Anemia     Anxiety     Colon polyp     Depression     Depression     Hypertension     PONV (postoperative nausea and vomiting)     PTSD (post-traumatic stress disorder)      Social History     Socioeconomic History    Marital status:    Tobacco Use    Smoking status: Never    Smokeless tobacco: Never   Substance and Sexual Activity    Alcohol use: Yes     Alcohol/week: 3.0 standard drinks     Types: 3 Glasses of wine per week     Comment: rare    Drug use: Not Currently    Sexual activity: Not Currently     Partners: Male     Social Determinants of Health     Financial Resource Strain: Low Risk     Difficulty of Paying Living Expenses: Not very hard   Food Insecurity: No Food Insecurity    Worried About Running Out of Food in the Last Year: Never true    Ran Out of Food in the Last Year: Never true   Transportation Needs: No Transportation  "Needs    Lack of Transportation (Medical): No    Lack of Transportation (Non-Medical): No   Physical Activity: Insufficiently Active    Days of Exercise per Week: 2 days    Minutes of Exercise per Session: 30 min   Stress: Stress Concern Present    Feeling of Stress : Very much   Social Connections: Unknown    Frequency of Communication with Friends and Family: Three times a week    Frequency of Social Gatherings with Friends and Family: Once a week    Active Member of Clubs or Organizations: No    Attends Club or Organization Meetings: Never    Marital Status:    Housing Stability: Low Risk     Unable to Pay for Housing in the Last Year: No    Number of Places Lived in the Last Year: 1    Unstable Housing in the Last Year: No     Past Surgical History:   Procedure Laterality Date    APPENDECTOMY      BREAST BIOPSY N/A     3 fibroadenomas removed.     BREAST FIBROADENOMA SURGERY Bilateral 1990's    3     CHOLECYSTECTOMY      COLONOSCOPY      COLONOSCOPY N/A 8/11/2022    Procedure: COLONOSCOPY;  Surgeon: Jael Phillips MD;  Location: [facility];  Service: Endoscopy;  Laterality: N/A;    EPIDURAL STEROID INJECTION INTO LUMBAR SPINE N/A 6/6/2023    Procedure: Injection-steroid-epidural-lumbar;  Surgeon: Jeff Velasquez MD;  Location: [facility];  Service: Pain Management;  Laterality: N/A;  L5-s1    ESOPHAGOGASTRODUODENOSCOPY N/A 8/11/2022    Procedure: EGD (ESOPHAGOGASTRODUODENOSCOPY);  Surgeon: Jael Phillips MD;  Location: [facility];  Service: Endoscopy;  Laterality: N/A;    SHOULDER SURGERY Right     TONSILLECTOMY       Family History   Problem Relation Age of Onset    Hypertension Father     Colon cancer Maternal Uncle 75    Heart disease Neg Hx     Heart attack Neg Hx     Breast cancer Neg Hx     Ovarian cancer Neg Hx     Colon polyps Neg Hx     Crohn's disease Neg Hx     Ulcerative colitis Neg Hx      Vitals:    07/12/23 1432   Weight: 85.9 kg (189 lb 6 oz)   Height: 5' 7.99" (1.727 m)       Review of " Systems   Constitutional:  Negative for chills, diaphoresis, fatigue, fever and unexpected weight change.   HENT:  Negative for trouble swallowing.    Eyes:  Negative for visual disturbance.   Respiratory:  Negative for shortness of breath.    Cardiovascular:  Negative for chest pain.   Gastrointestinal:  Negative for abdominal pain, constipation, nausea and vomiting.   Genitourinary:  Negative for difficulty urinating.   Musculoskeletal:  Negative for arthralgias, back pain, gait problem, joint swelling, myalgias, neck pain and neck stiffness.   Neurological:  Negative for dizziness, speech difficulty, weakness, light-headedness, numbness and headaches.        Objective:      Physical Exam  Neurological:      Mental Status: She is alert and oriented to person, place, and time.           Assessment:       1. Chronic right-sided low back pain with right-sided sciatica    2. Rib pain           Plan:     Improved but still not satisfied with her quality of life status post interlaminar injection at L5-S1.  Historically she says she has required more than 1 epidural steroid injection to get relief so we will schedule for 2nd injection.  I am going to get a CT of the chest to evaluate her ribs and refer her to Dr. Velasquez to consider a nerve block.  She can follow up with me after the procedure      Chronic right-sided low back pain with right-sided sciatica    Rib pain  -     CT Chest Without Contrast; Future; Expected date: 07/12/2023  -     Ambulatory referral/consult to Pain Clinic; Future; Expected date: 07/19/2023

## 2023-07-13 ENCOUNTER — TELEPHONE (OUTPATIENT)
Dept: FAMILY MEDICINE | Facility: CLINIC | Age: 63
End: 2023-07-13
Payer: COMMERCIAL

## 2023-07-19 ENCOUNTER — HOSPITAL ENCOUNTER (OUTPATIENT)
Dept: RADIOLOGY | Facility: HOSPITAL | Age: 63
Discharge: HOME | End: 2023-07-19
Attending: PHYSICAL MEDICINE & REHABILITATION
Payer: COMMERCIAL

## 2023-07-19 DIAGNOSIS — R07.81 RIB PAIN: ICD-10-CM

## 2023-07-19 PROCEDURE — 71250 CT THORAX DX C-: CPT | Mod: TC

## 2023-07-20 ENCOUNTER — TELEPHONE (OUTPATIENT)
Dept: SPINE | Facility: CLINIC | Age: 63
End: 2023-07-20
Payer: COMMERCIAL

## 2023-08-11 NOTE — DISCHARGE INSTRUCTIONS
Pain injection instructions:     This procedure may take a couple weeks to relieve pain  You may get some pain relief from the local anesthetic initally.   Steroids can have side effects of flushed face or nervous feeling.    No driving for 24 hrs.   Activity as tolerated- gradually increase activities.  Dont lift over 10 lbs for 24 hrs   No heat at injection sites for 2 full days. No heating pads, hot tubs, saunas, or swimming in any body of water or pool for 2 full days.  Use ice pack for mild swelling and for comfort , apply for 20 minutes, remove for 20 minute intervals. No direct contact of ice itself  to skin.  May shower today.  Do not allow shower water to beat on injections site(s) for 2 full days. No tub baths for two full days.      Resume Aspirin, Plavix, or Coumadin the day after the procedure unless otherwise instructed.   If diabetic,monitor your glucose carefully as steroids can increase your glucose level    Seek immediate medical help for:     Severe increase in pain not relieved by medication or ice or any new pain in the region .    Prolonged (more than 24 hrs)or increasing weakness or numbness in the legs or arms. - it is normal to have weakness/numbness for up to 8 hrs.   Fever above 100 degrees F , Drainage,redness,active bleeding, or increased swelling at the injection site.  Headache that increases when sitting up, but decreases when lying down.  New shortness of breath, chest pain, or breathing problems.    After Surgery:  Always be aware that any surgery can cause these symptoms:    Pain- Medication can be prescribed for pain to decrease your pain but may not completely take your pain away. Over the Counter pain medicine my be enough and you can always use Ice and rest to help ease pain.    Bleeding- a little bleeding after a surgery is usually within normal.  If there is a lot of blood you need to notify your MD.  Emergency treatments of bleeding are cold application, elevation of the  bleeding site and compression.    Infection- Infection after surgery is NOT a normal occurrence.  Signs of infection are fever, swelling, hot to touch the incision.  If this occurs notify your MD immediately.    Nausea- this can be common after a surgery especially if you have had anesthesia medicine or are taking pain medicine.  Steroids have a side effect of nausea sometimes. Staying on clear liquids, bland foods, gingerale, or over the counter anti nausea medicines can help.  If you vomit more than once, notify your MD.  Anti Nausea medicines can be prescribed.

## 2023-08-15 ENCOUNTER — HOSPITAL ENCOUNTER (OUTPATIENT)
Facility: HOSPITAL | Age: 63
Discharge: HOME | End: 2023-08-15
Attending: ANESTHESIOLOGY | Admitting: ANESTHESIOLOGY
Payer: COMMERCIAL

## 2023-08-15 DIAGNOSIS — M54.16 LUMBAR RADICULITIS: ICD-10-CM

## 2023-08-15 PROCEDURE — A4216 STERILE WATER/SALINE, 10 ML: HCPCS | Performed by: ANESTHESIOLOGY

## 2023-08-15 PROCEDURE — 25000003 PHARM REV CODE 250: Performed by: ANESTHESIOLOGY

## 2023-08-15 PROCEDURE — 63600175 PHARM REV CODE 636 W HCPCS: Performed by: ANESTHESIOLOGY

## 2023-08-15 PROCEDURE — 62323 PR INJ LUMBAR/SACRAL, W/IMAGING GUIDANCE: ICD-10-PCS | Mod: ,,, | Performed by: ANESTHESIOLOGY

## 2023-08-15 PROCEDURE — 62323 NJX INTERLAMINAR LMBR/SAC: CPT | Performed by: ANESTHESIOLOGY

## 2023-08-15 PROCEDURE — 62323 NJX INTERLAMINAR LMBR/SAC: CPT | Mod: ,,, | Performed by: ANESTHESIOLOGY

## 2023-08-15 PROCEDURE — 25500020 PHARM REV CODE 255: Performed by: ANESTHESIOLOGY

## 2023-08-15 RX ORDER — MIDAZOLAM HYDROCHLORIDE 1 MG/ML
INJECTION INTRAMUSCULAR; INTRAVENOUS
Status: DISCONTINUED | OUTPATIENT
Start: 2023-08-15 | End: 2023-08-15 | Stop reason: HOSPADM

## 2023-08-15 RX ORDER — FENTANYL CITRATE 50 UG/ML
INJECTION, SOLUTION INTRAMUSCULAR; INTRAVENOUS
Status: DISCONTINUED | OUTPATIENT
Start: 2023-08-15 | End: 2023-08-15 | Stop reason: HOSPADM

## 2023-08-15 RX ORDER — SODIUM CHLORIDE, SODIUM LACTATE, POTASSIUM CHLORIDE, CALCIUM CHLORIDE 600; 310; 30; 20 MG/100ML; MG/100ML; MG/100ML; MG/100ML
INJECTION, SOLUTION INTRAVENOUS CONTINUOUS
Status: ACTIVE | OUTPATIENT
Start: 2023-08-15

## 2023-08-15 RX ORDER — DEXAMETHASONE SODIUM PHOSPHATE 10 MG/ML
INJECTION INTRAMUSCULAR; INTRAVENOUS
Status: DISCONTINUED | OUTPATIENT
Start: 2023-08-15 | End: 2023-08-15 | Stop reason: HOSPADM

## 2023-08-15 RX ORDER — SODIUM CHLORIDE 9 MG/ML
INJECTION, SOLUTION INTRAMUSCULAR; INTRAVENOUS; SUBCUTANEOUS
Status: DISCONTINUED | OUTPATIENT
Start: 2023-08-15 | End: 2023-08-15 | Stop reason: HOSPADM

## 2023-08-15 RX ORDER — LIDOCAINE HYDROCHLORIDE 10 MG/ML
INJECTION, SOLUTION EPIDURAL; INFILTRATION; INTRACAUDAL; PERINEURAL
Status: DISCONTINUED | OUTPATIENT
Start: 2023-08-15 | End: 2023-08-15 | Stop reason: HOSPADM

## 2023-08-15 RX ORDER — LIDOCAINE HYDROCHLORIDE 10 MG/ML
1 INJECTION, SOLUTION EPIDURAL; INFILTRATION; INTRACAUDAL; PERINEURAL ONCE
Status: ACTIVE | OUTPATIENT
Start: 2023-08-15

## 2023-08-15 RX ORDER — ONDANSETRON 2 MG/ML
INJECTION INTRAMUSCULAR; INTRAVENOUS
Status: DISCONTINUED | OUTPATIENT
Start: 2023-08-15 | End: 2023-08-15 | Stop reason: HOSPADM

## 2023-08-15 RX ADMIN — SODIUM CHLORIDE, POTASSIUM CHLORIDE, SODIUM LACTATE AND CALCIUM CHLORIDE: 600; 310; 30; 20 INJECTION, SOLUTION INTRAVENOUS at 08:08

## 2023-08-15 NOTE — DISCHARGE SUMMARY
[facility] - Periop Services  Discharge Note  Short Stay    Procedure(s) (LRB):  Injection-steroid-epidural-lumbar (N/A)      OUTCOME: Patient tolerated treatment/procedure well without complication and is now ready for discharge.    DISPOSITION: Home or Self Care    FINAL DIAGNOSIS:  <principal problem not specified>    FOLLOWUP: In clinic    DISCHARGE INSTRUCTIONS:    Discharge Procedure Orders   Notify your health care provider if you experience any of the following:  temperature >100.4     Notify your health care provider if you experience any of the following:  severe uncontrolled pain     Notify your health care provider if you experience any of the following:  redness, tenderness, or signs of infection (pain, swelling, redness, odor or green/yellow discharge around incision site)     Activity as tolerated        TIME SPENT ON DISCHARGE:   30 minutes

## 2023-08-15 NOTE — OP NOTE
PROCEDURE DATE: 8/15/2023    Procedure:   Interlaminar epidural steroid injection at L5-S1 under fluoroscopic guidance.    Diagnosis: lUMBAR radiculitis  pOSTOP DIAGNOSIS: sAME    Physician: Jeff Velasquez M.D.    Medications injected:10 mg dexamethasone with 4 ml of preservative free NaCl    Local anesthetic injected:    Lidocaine 1% 2 ml total    Sedation Medications: RN IV sedation    Estimated blood loss:  None    Complications:  None    Technique:  Time-out taken to identify patient and procedure prior to starting the procedure.  With the patient laying in a prone position, the area was prepped and draped in the usual sterile fashion using ChloraPrep and a fenestrated drape.  After determining the target level with an AP fluoroscopic view, local anesthetic was given using a 25-gauge 1.5 inch needle by raising a wheal and then infiltrating toward the interlaminar entry space.  A 3.5inch 20-gauge Touhy needle was introduced under AP fluoroscopic guidance to the interlaminar space of L5-S1. Once the trajectory was established, the needle was visualized in the lateral view and advanced using loss of resistance technique. Once in the desired position, 1ml contrast was injected to confirm placement and there was no vascular uptake nor intrathecal spread.  The medication was then injected slowly. The patient tolerated the procedure well.      The patient was monitored after the procedure.   They were given post-procedure and discharge instructions to follow at home.  The patient was discharged in a stable condition.

## 2023-08-15 NOTE — H&P
CC: low back pain    HPI: The patient is a 63 y.o. female with a history of low back pain here for JIMMY. There are no major changes in history and physical from 7/12/23 by [place].    Past Medical History:   Diagnosis Date    Anemia     Anxiety     Colon polyp     Depression     Depression     Hypertension     PONV (postoperative nausea and vomiting)     PTSD (post-traumatic stress disorder)        Past Surgical History:   Procedure Laterality Date    APPENDECTOMY      BREAST BIOPSY N/A     3 fibroadenomas removed.     BREAST FIBROADENOMA SURGERY Bilateral 1990's    3     CHOLECYSTECTOMY      COLONOSCOPY      COLONOSCOPY N/A 8/11/2022    Procedure: COLONOSCOPY;  Surgeon: Jael Phillips MD;  Location: [facility] ENDO;  Service: Endoscopy;  Laterality: N/A;    EPIDURAL STEROID INJECTION INTO LUMBAR SPINE N/A 6/6/2023    Procedure: Injection-steroid-epidural-lumbar;  Surgeon: Jeff Velasquez MD;  Location: [facility] OR;  Service: Pain Management;  Laterality: N/A;  L5-s1    ESOPHAGOGASTRODUODENOSCOPY N/A 8/11/2022    Procedure: EGD (ESOPHAGOGASTRODUODENOSCOPY);  Surgeon: Jael Phillips MD;  Location: [facility];  Service: Endoscopy;  Laterality: N/A;    SHOULDER SURGERY Right     TONSILLECTOMY         Family History   Problem Relation Age of Onset    Hypertension Father     Colon cancer Maternal Uncle 75    Heart disease Neg Hx     Heart attack Neg Hx     Breast cancer Neg Hx     Ovarian cancer Neg Hx     Colon polyps Neg Hx     Crohn's disease Neg Hx     Ulcerative colitis Neg Hx        Social History     Socioeconomic History    Marital status:    Tobacco Use    Smoking status: Never    Smokeless tobacco: Never   Substance and Sexual Activity    Alcohol use: Yes     Alcohol/week: 3.0 standard drinks of alcohol     Types: 3 Glasses of wine per week     Comment: rare    Drug use: Not Currently    Sexual activity: Not Currently     Partners: Male     Social Determinants of Health     Financial Resource Strain: Low Risk   "(12/29/2022)    Overall Financial Resource Strain (CARDIA)     Difficulty of Paying Living Expenses: Not very hard   Food Insecurity: No Food Insecurity (12/29/2022)    Hunger Vital Sign     Worried About Running Out of Food in the Last Year: Never true     Ran Out of Food in the Last Year: Never true   Transportation Needs: No Transportation Needs (12/29/2022)    PRAPARE - Transportation     Lack of Transportation (Medical): No     Lack of Transportation (Non-Medical): No   Physical Activity: Insufficiently Active (12/29/2022)    Exercise Vital Sign     Days of Exercise per Week: 2 days     Minutes of Exercise per Session: 30 min   Stress: Stress Concern Present (12/29/2022)    [employer] - Occupational Stress Questionnaire     Feeling of Stress : Very much   Social Connections: Unknown (12/29/2022)    Social Connection and Isolation Panel [NHANES]     Frequency of Communication with Friends and Family: Three times a week     Frequency of Social Gatherings with Friends and Family: Once a week     Active Member of Clubs or Organizations: No     Attends Club or Organization Meetings: Never     Marital Status:    Housing Stability: Low Risk  (12/29/2022)    Housing Stability Vital Sign     Unable to Pay for Housing in the Last Year: No     Number of Places Lived in the Last Year: 1     Unstable Housing in the Last Year: No       Current Facility-Administered Medications   Medication Dose Route Frequency Provider Last Rate Last Admin    lactated ringers infusion   Intravenous Continuous Jeff Velasquez MD        LIDOcaine (PF) 10 mg/ml (1%) injection 10 mg  1 mL Intradermal Once Jeff Velasquez MD           Review of patient's allergies indicates:   Allergen Reactions    Fish containing products Hives, Itching, Swelling and Rash     Pt. States can eat shellfish.       Vitals:    08/08/23 1348   Weight: 85.9 kg (189 lb 6 oz)   Height: 5' 7.99" (1.727 m)       REVIEW OF SYSTEMS:   "   GENERAL: No weight loss, malaise or fevers.  HEENT:  No recent changes in vision or hearing  NECK: Negative for lumps, no difficulty with swallowing.  RESPIRATORY: Negative for cough, wheezing or shortness of breath, patient denies any recent URI.  CARDIOVASCULAR: Negative for chest pain, leg swelling or palpitations.  GI: Negative for abdominal discomfort, blood in stools or black stools or change in bowel habits.  MUSCULOSKELETAL: See HPI.  SKIN: Negative for lesions, rash, and itching.  PSYCH: No suicidal or homicidal ideations, no current mood disturbances.  HEMATOLOGY/LYMPHOLOGY: Negative for prolonged bleeding, bruising easily or swollen nodes. Patient is not currently taking any anti-coagulants  ENDO: No history of diabetes or thyroid dysfunction  NEURO: No history of syncope, paralysis, seizures or tremors.All other reviewed and negative other than HPI.    Physical exam:  Gen: A and O x3, pleasant, well-groomed  Skin: No rashes or obvious lesions  HEENT: PERRLA, no obvious deformities on ears or in canals. No thyroid masses, trachea midline, no palpable lymph nodes in neck, axilla.  CVS: Regular rate and rhythm, normal S1 and S2, no murmurs.  Resp: Clear to auscultation bilaterally.  Abdomen: Soft, NT/ND, normal bowel sounds present.  Musculoskeletal/Neuro: Moving all extremities    Assessment:  Lumbar radiculitis    Other orders  -     [place] [facility] for Pain Management; Standing          PLAN: JIMMY      This patient has been cleared for surgery in an ambulatory surgical facility    ASA 3,  Mallampatti Score 3  No history of anesthetic complications  Plan for RN IV sedation

## 2023-08-16 VITALS
RESPIRATION RATE: 20 BRPM | WEIGHT: 189.38 LBS | HEIGHT: 68 IN | BODY MASS INDEX: 28.7 KG/M2 | HEART RATE: 59 BPM | OXYGEN SATURATION: 90 % | TEMPERATURE: 99 F | DIASTOLIC BLOOD PRESSURE: 84 MMHG | SYSTOLIC BLOOD PRESSURE: 141 MMHG

## 2023-09-08 ENCOUNTER — OFFICE VISIT (OUTPATIENT)
Dept: SPINE | Facility: CLINIC | Age: 63
End: 2023-09-08
Payer: COMMERCIAL

## 2023-09-08 ENCOUNTER — TELEPHONE (OUTPATIENT)
Dept: PAIN MEDICINE | Facility: CLINIC | Age: 63
End: 2023-09-08
Payer: COMMERCIAL

## 2023-09-08 DIAGNOSIS — G89.29 CHRONIC RIGHT-SIDED LOW BACK PAIN WITH RIGHT-SIDED SCIATICA: Primary | ICD-10-CM

## 2023-09-08 DIAGNOSIS — M54.41 CHRONIC RIGHT-SIDED LOW BACK PAIN WITH RIGHT-SIDED SCIATICA: Primary | ICD-10-CM

## 2023-09-08 PROCEDURE — 1159F MED LIST DOCD IN RCRD: CPT | Mod: CPTII,S$GLB,, | Performed by: PHYSICAL MEDICINE & REHABILITATION

## 2023-09-08 PROCEDURE — 1159F PR MEDICATION LIST DOCUMENTED IN MEDICAL RECORD: ICD-10-PCS | Mod: CPTII,S$GLB,, | Performed by: PHYSICAL MEDICINE & REHABILITATION

## 2023-09-08 PROCEDURE — 1160F PR REVIEW ALL MEDS BY PRESCRIBER/CLIN PHARMACIST DOCUMENTED: ICD-10-PCS | Mod: CPTII,S$GLB,, | Performed by: PHYSICAL MEDICINE & REHABILITATION

## 2023-09-08 PROCEDURE — 99213 OFFICE O/P EST LOW 20 MIN: CPT | Mod: S$GLB,,, | Performed by: PHYSICAL MEDICINE & REHABILITATION

## 2023-09-08 PROCEDURE — 1160F RVW MEDS BY RX/DR IN RCRD: CPT | Mod: CPTII,S$GLB,, | Performed by: PHYSICAL MEDICINE & REHABILITATION

## 2023-09-08 PROCEDURE — 99213 PR OFFICE/OUTPT VISIT, EST, LEVL III, 20-29 MIN: ICD-10-PCS | Mod: S$GLB,,, | Performed by: PHYSICAL MEDICINE & REHABILITATION

## 2023-09-08 NOTE — PROGRESS NOTES
SUBJECTIVE:    Patient ID: Leticia Zelaya is a 63 y.o. female.    Chief Complaint: No chief complaint on file.    She is here for follow-up status post interlaminar injection L5-S1 on 08/15/2023 with Dr. Velasquez.  This is her 2nd epidural steroid injection at that level.  She describes 30% improvement in her right leg pain.  Having said that pain still significantly interferes with her quality of life.  Current pain level is 3/10 but at times is his high as 8/10 and interferes with her quality of life with regards to recreation and sleeping.  She also still bothered by right-sided lateral chest wall pain.  I have placed a referral for her to see Dr. Velasquez about that issue.  Otherwise she has no new or progressive problems.          Past Medical History:   Diagnosis Date    Anemia     Anxiety     Colon polyp     Depression     Depression     Hypertension     PONV (postoperative nausea and vomiting)     PTSD (post-traumatic stress disorder)      Social History     Socioeconomic History    Marital status:    Tobacco Use    Smoking status: Never    Smokeless tobacco: Never   Substance and Sexual Activity    Alcohol use: Yes     Alcohol/week: 3.0 standard drinks of alcohol     Types: 3 Glasses of wine per week     Comment: rare    Drug use: Not Currently    Sexual activity: Not Currently     Partners: Male     Social Determinants of Health     Financial Resource Strain: Low Risk  (12/29/2022)    Overall Financial Resource Strain (CARDIA)     Difficulty of Paying Living Expenses: Not very hard   Food Insecurity: No Food Insecurity (12/29/2022)    Hunger Vital Sign     Worried About Running Out of Food in the Last Year: Never true     Ran Out of Food in the Last Year: Never true   Transportation Needs: No Transportation Needs (12/29/2022)    PRAPARE - Transportation     Lack of Transportation (Medical): No     Lack of Transportation (Non-Medical): No   Physical Activity: Insufficiently Active (12/29/2022)    Exercise  Vital Sign     Days of Exercise per Week: 2 days     Minutes of Exercise per Session: 30 min   Stress: Stress Concern Present (12/29/2022)    [employer] - Occupational Stress Questionnaire     Feeling of Stress : Very much   Social Connections: Unknown (12/29/2022)    Social Connection and Isolation Panel [NHANES]     Frequency of Communication with Friends and Family: Three times a week     Frequency of Social Gatherings with Friends and Family: Once a week     Active Member of Clubs or Organizations: No     Attends Club or Organization Meetings: Never     Marital Status:    Housing Stability: Low Risk  (12/29/2022)    Housing Stability Vital Sign     Unable to Pay for Housing in the Last Year: No     Number of Places Lived in the Last Year: 1     Unstable Housing in the Last Year: No     Past Surgical History:   Procedure Laterality Date    APPENDECTOMY      BREAST BIOPSY N/A     3 fibroadenomas removed.     BREAST FIBROADENOMA SURGERY Bilateral 1990's    3     CHOLECYSTECTOMY      COLONOSCOPY      COLONOSCOPY N/A 8/11/2022    Procedure: COLONOSCOPY;  Surgeon: Jael Phillips MD;  Location: [facility] ENDO;  Service: Endoscopy;  Laterality: N/A;    EPIDURAL STEROID INJECTION INTO LUMBAR SPINE N/A 6/6/2023    Procedure: Injection-steroid-epidural-lumbar;  Surgeon: Jeff Velasquez MD;  Location: [facility] OR;  Service: Pain Management;  Laterality: N/A;  L5-s1    EPIDURAL STEROID INJECTION INTO LUMBAR SPINE N/A 8/15/2023    Procedure: Injection-steroid-epidural-lumbar;  Surgeon: Jeff Velasquez MD;  Location: [facility] OR;  Service: Anesthesiology;  Laterality: N/A;  l5-s1    ESOPHAGOGASTRODUODENOSCOPY N/A 8/11/2022    Procedure: EGD (ESOPHAGOGASTRODUODENOSCOPY);  Surgeon: Jael Phillips MD;  Location: [facility] ENDO;  Service: Endoscopy;  Laterality: N/A;    SHOULDER SURGERY Right     TONSILLECTOMY       Family History   Problem Relation Age of Onset    Hypertension Father     Colon cancer  Maternal Uncle 75    Heart disease Neg Hx     Heart attack Neg Hx     Breast cancer Neg Hx     Ovarian cancer Neg Hx     Colon polyps Neg Hx     Crohn's disease Neg Hx     Ulcerative colitis Neg Hx      There were no vitals filed for this visit.    Review of Systems   Constitutional:  Negative for chills, diaphoresis, fatigue, fever and unexpected weight change.   HENT:  Negative for trouble swallowing.    Eyes:  Negative for visual disturbance.   Respiratory:  Negative for shortness of breath.    Cardiovascular:  Negative for chest pain.   Gastrointestinal:  Negative for abdominal pain, constipation, nausea and vomiting.   Genitourinary:  Negative for difficulty urinating.   Musculoskeletal:  Negative for arthralgias, back pain, gait problem, joint swelling, myalgias, neck pain and neck stiffness.   Neurological:  Negative for dizziness, speech difficulty, weakness, light-headedness, numbness and headaches.          Objective:      Physical Exam  Neurological:      Mental Status: She is alert and oriented to person, place, and time.             Assessment:       1. Chronic right-sided low back pain with right-sided sciatica           Plan:     Improved but still not satisfied with quality of life status post interlaminar injection at L5-S1.  She wants to repeat the procedure.  She can follow up with me afterward.  Consider MRI of the lumbar spine.  Consider transforaminal injections on the right at L5-S1 and S1.      Chronic right-sided low back pain with right-sided sciatica

## 2023-09-08 NOTE — TELEPHONE ENCOUNTER
----- Message from Fernando Conrad MD sent at 9/8/2023  3:51 PM CDT -----  Please schedule for interlaminar injection L5-S1

## 2023-09-11 NOTE — TELEPHONE ENCOUNTER
Will be denied due to only being less than 1 month since Last. Must document at least 3 months relief of 50%. Possibly changing to Trans foraminal as Dr. Conrad possibly recommended for furture ESIs may be approved. Please check with him to see if he is ok with this change. Thank you.

## 2023-09-12 ENCOUNTER — TELEPHONE (OUTPATIENT)
Dept: SPINE | Facility: CLINIC | Age: 63
End: 2023-09-12
Payer: COMMERCIAL

## 2023-09-12 NOTE — TELEPHONE ENCOUNTER
VM left to confirm with pt per Dr. Conrad that procedure can be changed to transforaminal injection right L5-S1 and S1 if she is in agreement

## 2023-09-13 ENCOUNTER — TELEPHONE (OUTPATIENT)
Dept: SPINE | Facility: CLINIC | Age: 63
End: 2023-09-13
Payer: COMMERCIAL

## 2023-09-13 NOTE — TELEPHONE ENCOUNTER
Did you hear back from this pt? Am I scheduling procedure? Or is she seeing Dr Velasquez on 09/27?

## 2023-09-13 NOTE — TELEPHONE ENCOUNTER
2nd VM left asking if she would like to change her procedure to trans foraminal or cancel procedure per Dr. Conrad due to insurance denial

## 2023-09-27 ENCOUNTER — TELEPHONE (OUTPATIENT)
Dept: PAIN MEDICINE | Facility: CLINIC | Age: 63
End: 2023-09-27

## 2023-09-27 ENCOUNTER — OFFICE VISIT (OUTPATIENT)
Dept: PAIN MEDICINE | Facility: CLINIC | Age: 63
End: 2023-09-27
Payer: COMMERCIAL

## 2023-09-27 VITALS
DIASTOLIC BLOOD PRESSURE: 73 MMHG | WEIGHT: 189 LBS | BODY MASS INDEX: 29.66 KG/M2 | HEIGHT: 67 IN | SYSTOLIC BLOOD PRESSURE: 137 MMHG | HEART RATE: 64 BPM

## 2023-09-27 DIAGNOSIS — R07.81 RIB PAIN: ICD-10-CM

## 2023-09-27 DIAGNOSIS — G58.8 INTERCOSTAL NEURALGIA: Primary | ICD-10-CM

## 2023-09-27 PROCEDURE — 3008F BODY MASS INDEX DOCD: CPT | Mod: CPTII,S$GLB,, | Performed by: ANESTHESIOLOGY

## 2023-09-27 PROCEDURE — 1159F PR MEDICATION LIST DOCUMENTED IN MEDICAL RECORD: ICD-10-PCS | Mod: CPTII,S$GLB,, | Performed by: ANESTHESIOLOGY

## 2023-09-27 PROCEDURE — 1159F MED LIST DOCD IN RCRD: CPT | Mod: CPTII,S$GLB,, | Performed by: ANESTHESIOLOGY

## 2023-09-27 PROCEDURE — 99999 PR PBB SHADOW E&M-EST. PATIENT-LVL III: CPT | Mod: PBBFAC,,, | Performed by: ANESTHESIOLOGY

## 2023-09-27 PROCEDURE — 1101F PT FALLS ASSESS-DOCD LE1/YR: CPT | Mod: CPTII,S$GLB,, | Performed by: ANESTHESIOLOGY

## 2023-09-27 PROCEDURE — 99214 OFFICE O/P EST MOD 30 MIN: CPT | Mod: S$GLB,,, | Performed by: ANESTHESIOLOGY

## 2023-09-27 PROCEDURE — 3078F DIAST BP <80 MM HG: CPT | Mod: CPTII,S$GLB,, | Performed by: ANESTHESIOLOGY

## 2023-09-27 PROCEDURE — 3008F PR BODY MASS INDEX (BMI) DOCUMENTED: ICD-10-PCS | Mod: CPTII,S$GLB,, | Performed by: ANESTHESIOLOGY

## 2023-09-27 PROCEDURE — 99999 PR PBB SHADOW E&M-EST. PATIENT-LVL III: ICD-10-PCS | Mod: PBBFAC,,, | Performed by: ANESTHESIOLOGY

## 2023-09-27 PROCEDURE — 1101F PR PT FALLS ASSESS DOC 0-1 FALLS W/OUT INJ PAST YR: ICD-10-PCS | Mod: CPTII,S$GLB,, | Performed by: ANESTHESIOLOGY

## 2023-09-27 PROCEDURE — 99214 PR OFFICE/OUTPT VISIT, EST, LEVL IV, 30-39 MIN: ICD-10-PCS | Mod: S$GLB,,, | Performed by: ANESTHESIOLOGY

## 2023-09-27 PROCEDURE — 3075F SYST BP GE 130 - 139MM HG: CPT | Mod: CPTII,S$GLB,, | Performed by: ANESTHESIOLOGY

## 2023-09-27 PROCEDURE — 3075F PR MOST RECENT SYSTOLIC BLOOD PRESS GE 130-139MM HG: ICD-10-PCS | Mod: CPTII,S$GLB,, | Performed by: ANESTHESIOLOGY

## 2023-09-27 PROCEDURE — 3288F PR FALLS RISK ASSESSMENT DOCUMENTED: ICD-10-PCS | Mod: CPTII,S$GLB,, | Performed by: ANESTHESIOLOGY

## 2023-09-27 PROCEDURE — 3078F PR MOST RECENT DIASTOLIC BLOOD PRESSURE < 80 MM HG: ICD-10-PCS | Mod: CPTII,S$GLB,, | Performed by: ANESTHESIOLOGY

## 2023-09-27 PROCEDURE — 3288F FALL RISK ASSESSMENT DOCD: CPT | Mod: CPTII,S$GLB,, | Performed by: ANESTHESIOLOGY

## 2023-09-27 NOTE — PROGRESS NOTES
This note was completed with dictation software and grammatical errors may exist.    Referring Physician: Fernando Conrad MD    PCP: Josh Aj MD      CC: right rib pain    HPI:   Leticia Zelaya is a 63 y.o. female referred to us for right rib pain.  Pain has been present for over 10 years.  She states so from anxiety and has had loss of consciousness due to anxiety.  During those falls, she is suffered right-sided rib fractures over the years.  She presents to us with continued right-sided rib pain he low her right breast.  Pain is a constant aching, grabbing, type pain in her right rib.  Pain worsens standing, walking, getting up bending.  Pain improves with heat.  She is tried physical therapy with mild benefits.  She is undergone IV ketamine infusions to help with her anxiety which has been helpful.  She does perform daily home exercises with mild benefits.  She denies any worsening weakness.  No bowel bladder changes.    ROS:  CONSTITUTIONAL: No fevers, chills, night sweats, wt. loss, appetite changes  SKIN: no rashes or itching  ENT: No headaches, head trauma, vision changes, or eye pain  LYMPH NODES: None noticed   CV: No chest pain, palpitations.   RESP: No shortness of breath, dyspnea on exertion, cough, wheezing, or hemoptysis  GI: No nausea, emesis, diarrhea, constipation, melena, hematochezia, pain.    : No dysuria, hematuria, urgency, or frequency   HEME: No easy bruising, bleeding problems  PSYCHIATRIC: No depression, anxiety, psychosis, hallucinations.  NEURO: No seizures, memory loss, dizziness or difficulty sleeping  MSK: +HPI      Past Medical History:   Diagnosis Date    Anemia     Anxiety     Colon polyp     Depression     Depression     Hypertension     PONV (postoperative nausea and vomiting)     PTSD (post-traumatic stress disorder)      Past Surgical History:   Procedure Laterality Date    APPENDECTOMY      BREAST BIOPSY N/A     3 fibroadenomas removed.     BREAST  FIBROADENOMA SURGERY Bilateral 1990's    3     CHOLECYSTECTOMY      COLONOSCOPY      COLONOSCOPY N/A 8/11/2022    Procedure: COLONOSCOPY;  Surgeon: Jael Phillips MD;  Location: [facility] ENDO;  Service: Endoscopy;  Laterality: N/A;    EPIDURAL STEROID INJECTION INTO LUMBAR SPINE N/A 6/6/2023    Procedure: Injection-steroid-epidural-lumbar;  Surgeon: Jeff Velasquez MD;  Location: [facility] OR;  Service: Pain Management;  Laterality: N/A;  L5-s1    EPIDURAL STEROID INJECTION INTO LUMBAR SPINE N/A 8/15/2023    Procedure: Injection-steroid-epidural-lumbar;  Surgeon: Jeff Velasquez MD;  Location: [facility] OR;  Service: Anesthesiology;  Laterality: N/A;  l5-s1    ESOPHAGOGASTRODUODENOSCOPY N/A 8/11/2022    Procedure: EGD (ESOPHAGOGASTRODUODENOSCOPY);  Surgeon: Jael Phillips MD;  Location: [facility] ENDO;  Service: Endoscopy;  Laterality: N/A;    SHOULDER SURGERY Right     TONSILLECTOMY       Family History   Problem Relation Age of Onset    Hypertension Father     Colon cancer Maternal Uncle 75    Heart disease Neg Hx     Heart attack Neg Hx     Breast cancer Neg Hx     Ovarian cancer Neg Hx     Colon polyps Neg Hx     Crohn's disease Neg Hx     Ulcerative colitis Neg Hx      Social History     Socioeconomic History    Marital status:    Tobacco Use    Smoking status: Never    Smokeless tobacco: Never   Substance and Sexual Activity    Alcohol use: Yes     Alcohol/week: 3.0 standard drinks of alcohol     Types: 3 Glasses of wine per week     Comment: rare    Drug use: Not Currently    Sexual activity: Not Currently     Partners: Male     Social Determinants of Health     Financial Resource Strain: Low Risk  (12/29/2022)    Overall Financial Resource Strain (CARDIA)     Difficulty of Paying Living Expenses: Not very hard   Food Insecurity: No Food Insecurity (12/29/2022)    Hunger Vital Sign     Worried About Running Out of Food in the Last Year: Never true     Ran Out of Food in the Last Year: Never true   Transportation  "Needs: No Transportation Needs (12/29/2022)    PRAPARE - Transportation     Lack of Transportation (Medical): No     Lack of Transportation (Non-Medical): No   Physical Activity: Insufficiently Active (12/29/2022)    Exercise Vital Sign     Days of Exercise per Week: 2 days     Minutes of Exercise per Session: 30 min   Stress: Stress Concern Present (12/29/2022)    [employer] [place] [employer] - Occupational Stress Questionnaire     Feeling of Stress : Very much   Social Connections: Unknown (12/29/2022)    Social Connection and Isolation Panel [NHANES]     Frequency of Communication with Friends and Family: Three times a week     Frequency of Social Gatherings with Friends and Family: Once a week     Active Member of Clubs or Organizations: No     Attends Club or Organization Meetings: Never     Marital Status:    Housing Stability: Low Risk  (12/29/2022)    Housing Stability Vital Sign     Unable to Pay for Housing in the Last Year: No     Number of Places Lived in the Last Year: 1     Unstable Housing in the Last Year: No         Medications/Allergies: See med card    Vitals:    09/27/23 0838   BP: 137/73   Pulse: 64   Weight: 85.7 kg (189 lb)   Height: 5' 7" (1.702 m)   PainSc:   2   PainLoc: Rib Cage         Physical exam:    GENERAL: A and O x3, the patient appears well groomed and is in no acute distress.  Skin: No rashes or obvious lesions  HEENT: normocephalic, atraumatic  CARDIOVASCULAR:  Palpable peripheral pulses  LUNGS: easy work of breathing.  There is some mild tenderness over deep palpation over the right T9, T10 ribs.  ABDOMEN: soft, nontender   UPPER EXTREMITIES: Normal alignment, normal range of motion, no atrophy, no skin changes,  hair growth and nail growth normal and equal bilaterally. No swelling, no tenderness.    LOWER EXTREMITIES:  Normal alignment, normal range of motion, no atrophy, no skin changes,  hair growth and nail growth normal and equal bilaterally. No " swelling, no tenderness.      LUMBAR SPINE  Lumbar spine: ROM is limited with flexion extension and oblique extension with moderate increased pain.    Dima's test causes no increased pain on either side.    Supine straight leg raise is negative bilaterally.    Internal and external rotation of the hip causes no increased pain on either side.  Myofascial exam: No tenderness to palpation across lumbar paraspinous muscles.      MENTAL STATUS: normal orientation, speech, language, and fund of knowledge for social situation.  Emotional state appropriate.      MOTOR: Tone and bulk: normal bilateral upper and lower Strength: normal   SENSATION: Light touch and pinprick intact bilaterally  REFLEXES: normal, symmetric, nonbrisk.  Toes down, no clonus. No hoffmans.  GAIT: normal rise, base, steps, and arm swing.        Imaging:  CT Chest 7/2023     IMPRESSION:     Negative for acute or aggressive osseous abnormality.     Multiple healed right-sided rib fracture deformities as discussed above, which are distant from the skin marker indicating the area of pain.     Incidental findings as above, including coronary atherosclerosis.          Assessment:  Patient presents with right rib pain.  1. Intercostal neuralgia    2. Rib pain          Plan:  I have stressed the importance of physical activity and exercise to improve overall health  Reviewed pertinent imaging and records with patient  Patient with continued right-sided rib pain with history of rib fractures.  I believe this is consistent with intercostal neuralgia.  Discuss performed intercostal nerve blocks to see which help with her pain.  Patient wishes to proceed.  Follow-up after procedure.    Thank you for referring this interesting patient, and I look forward to continuing to collaborate in her care.

## 2023-09-27 NOTE — TELEPHONE ENCOUNTER
Types of orders made on 09/27/2023: Procedure Request      Order Date:9/27/2023   Ordering User:SUE VELASQUEZ [[id number]]   Encounter Provider:Sue Velasquez MD [[id number]]   Authorizing Provider: MARICRUZ Velasquez MD [[id number]]   Department:[facility] PAIN MANAGEMENT[[id number]]      Common Order Information   Procedure -> Other (Specify location and laterality) Cmt: Right T8,9,10              intercostal nerve blocks      Order Specific Information   Order: Procedure Order to Pain Management [Custom: [id number]]  Order #:           [id number]: 1 FUTURE     Priority: Routine  Class: Clinic Performed     Future Order Information          Expires on:09/27/2024            Expected by:09/27/2023                    Associated Diagnoses       G58.8 Intercostal neuralgia       Facility Name: -> [facility]               Priority: Routine  Class: Clinic Performed     Future Order Information       Expires on:09/27/2024            Expected by:09/27/2023                    Associated Diagnoses       G58.8 Intercostal neuralgia       Procedure -> Other (Sp   ecify location and laterality) Cmt: Right T8,9,10                  intercostal nerve blocks

## 2023-10-09 DIAGNOSIS — K29.70 GASTRITIS, PRESENCE OF BLEEDING UNSPECIFIED, UNSPECIFIED CHRONICITY, UNSPECIFIED GASTRITIS TYPE: ICD-10-CM

## 2023-10-09 DIAGNOSIS — R07.81 RIB PAIN: ICD-10-CM

## 2023-10-09 RX ORDER — OMEPRAZOLE 40 MG/1
40 CAPSULE, DELAYED RELEASE ORAL DAILY
Qty: 90 CAPSULE | Refills: 1 | Status: SHIPPED | OUTPATIENT
Start: 2023-10-09 | End: 2024-10-08

## 2023-10-09 RX ORDER — CYCLOBENZAPRINE HCL 5 MG
TABLET ORAL
Qty: 30 TABLET | Refills: 1 | Status: SHIPPED | OUTPATIENT
Start: 2023-10-09

## 2023-10-09 NOTE — TELEPHONE ENCOUNTER
No care due was identified.  [facility] Embedded Care Due Messages. Reference number: [id number].   10/09/2023 12:24:53 PM CDT

## 2023-11-13 ENCOUNTER — HOSPITAL ENCOUNTER (EMERGENCY)
Facility: HOSPITAL | Age: 63
Discharge: HOME | End: 2023-11-13
Attending: EMERGENCY MEDICINE
Payer: COMMERCIAL

## 2023-11-13 ENCOUNTER — PATIENT MESSAGE (OUTPATIENT)
Dept: FAMILY MEDICINE | Facility: CLINIC | Age: 63
End: 2023-11-13
Payer: COMMERCIAL

## 2023-11-13 VITALS
HEART RATE: 70 BPM | DIASTOLIC BLOOD PRESSURE: 72 MMHG | RESPIRATION RATE: 18 BRPM | OXYGEN SATURATION: 95 % | SYSTOLIC BLOOD PRESSURE: 132 MMHG | HEIGHT: 67 IN | TEMPERATURE: 98 F | WEIGHT: 189 LBS | BODY MASS INDEX: 29.66 KG/M2

## 2023-11-13 DIAGNOSIS — T17.908A ASPIRATION INTO AIRWAY: ICD-10-CM

## 2023-11-13 DIAGNOSIS — T17.908A ASPIRATION INTO AIRWAY, INITIAL ENCOUNTER: Primary | ICD-10-CM

## 2023-11-13 PROCEDURE — 99283 EMERGENCY DEPT VISIT LOW MDM: CPT | Mod: 25

## 2023-11-13 RX ORDER — AMOXICILLIN AND CLAVULANATE POTASSIUM 875; 125 MG/1; MG/1
1 TABLET, FILM COATED ORAL 2 TIMES DAILY
Qty: 14 TABLET | Refills: 0 | Status: SHIPPED | OUTPATIENT
Start: 2023-11-13 | End: 2023-12-08

## 2023-11-13 NOTE — ED PROVIDER NOTES
Encounter Date: 11/13/2023       History     Chief Complaint   Patient presents with    Cough    Vomiting     Possible aspiration of pill this am      63-year-old female with a history of hypertension presents after an episode of aspiration this morning.  Patient took a handful of her morning medications including a new larger iron capsule.  She feels like this broke open in her throat and released all the contents.  This caused her to cough and gag and vomit afterwards.  Did not aspirate on the vomit.  She feels better at this time but thinks she  needs abx because of something she read online.  Denies shortness of breath currently.  Patient has a trip on Sunday and does not want to get sick before the trip.    The history is provided by the patient.   Dictation #1  MRN:[id number]  CSN:[id number]   Review of patient's allergies indicates:   Allergen Reactions    Fish containing products Hives, Itching, Swelling and Rash     Pt. States can eat shellfish.     Past Medical History:   Diagnosis Date    Anemia     Anxiety     Colon polyp     Depression     Depression     Hypertension     PONV (postoperative nausea and vomiting)     PTSD (post-traumatic stress disorder)      Past Surgical History:   Procedure Laterality Date    APPENDECTOMY      BREAST BIOPSY N/A     3 fibroadenomas removed.     BREAST FIBROADENOMA SURGERY Bilateral 1990's    3     CHOLECYSTECTOMY      COLONOSCOPY      COLONOSCOPY N/A 8/11/2022    Procedure: COLONOSCOPY;  Surgeon: Jael Phillips MD;  Location: [facility];  Service: Endoscopy;  Laterality: N/A;    EPIDURAL STEROID INJECTION INTO LUMBAR SPINE N/A 6/6/2023    Procedure: Injection-steroid-epidural-lumbar;  Surgeon: Jeff Velasquez MD;  Location: [facility] OR;  Service: Pain Management;  Laterality: N/A;  L5-s1    EPIDURAL STEROID INJECTION INTO LUMBAR SPINE N/A 8/15/2023    Procedure: Injection-steroid-epidural-lumbar;  Surgeon: Jfef Velasquez MD;  Location: [facility] OR;  Service: Anesthesiology;   Laterality: N/A;  l5-s1    ESOPHAGOGASTRODUODENOSCOPY N/A 8/11/2022    Procedure: EGD (ESOPHAGOGASTRODUODENOSCOPY);  Surgeon: Jael Phillips MD;  Location: [facility];  Service: Endoscopy;  Laterality: N/A;    SHOULDER SURGERY Right     TONSILLECTOMY       Family History   Problem Relation Age of Onset    Hypertension Father     Colon cancer Maternal Uncle 75    Heart disease Neg Hx     Heart attack Neg Hx     Breast cancer Neg Hx     Ovarian cancer Neg Hx     Colon polyps Neg Hx     Crohn's disease Neg Hx     Ulcerative colitis Neg Hx      Social History     Tobacco Use    Smoking status: Never    Smokeless tobacco: Never   Substance Use Topics    Alcohol use: Yes     Alcohol/week: 3.0 standard drinks of alcohol     Types: 3 Glasses of wine per week     Comment: rare    Drug use: Not Currently     Review of Systems   Constitutional: Negative.    Respiratory:  Negative for cough and shortness of breath.        Physical Exam     Initial Vitals [11/13/23 1157]   BP Pulse Resp Temp SpO2   139/80 86 20 98.4 °F (36.9 °C) 95 %      MAP       --         Physical Exam    Nursing note and vitals reviewed.  Constitutional: She appears well-developed and well-nourished.   HENT:   Head: Normocephalic and atraumatic.   Eyes: EOM are normal.   Neck: Neck supple.   Normal range of motion.  Cardiovascular:  Normal rate, regular rhythm and normal heart sounds.     Exam reveals no gallop and no friction rub.       No murmur heard.  Pulmonary/Chest: Breath sounds normal. No respiratory distress. She has no wheezes. She has no rhonchi. She has no rales.   Musculoskeletal:         General: Normal range of motion.      Cervical back: Normal range of motion and neck supple.     Neurological: She is alert and oriented to person, place, and time.   Skin: Skin is warm and dry.   Psychiatric: She has a normal mood and affect. Her behavior is normal. Judgment and thought content normal.         ED Course   Procedures  Labs Reviewed - No  data to display       Imaging Results              X-Ray Chest PA And Lateral (Final result)  Result time 11/13/23 13:57:17      Final result by Rivera Ibarra MD (11/13/23 13:57:17)                   Narrative:    EXAMINATION:  XR CHEST PA AND LATERAL    CLINICAL HISTORY:  Unspecified foreign body in respiratory tract, part unspecified causing other injury, initial encounter    TECHNIQUE:  PA and lateral views of the chest were performed.    COMPARISON:  01/02/2022 and 12/11/2020    FINDINGS:  Lungs are clear.Normal cardiomediastinal silhouette.Normal pulmonary vascular distribution.No pleural effusion or pneumothorax.Mild multilevel degenerative changes in the spine.  Cholecystectomy.      Electronically signed by: Rivera Ibarra  Date:    11/13/2023  Time:    13:57                                     Medications - No data to display  Medical Decision Making  1625 Healthy 63-year-old female presents to the ER after an episode of choking likely aspiration on a pill that broke in her throat.  She does not feel any retained foreign body sensation.  I do not suspect retained esophageal pill fragments.  She feels much better at this time.  She thought she needed antibiotics.  I told her that would be almost unheard of to develop an aspiration pneumonia after an event like this.  Out of an abundance of caution because she has an upcoming trip I wrote her Augmentin but told her to take this only if she develops symptoms such as a productive cough or fever or pleuritic chest pain or shortness of breath.  Return to the ER for any concerns.  Do not take these antibiotics unless she becomes symptomatic.  She is well-appearing at this time and has no evidence of any airway compromise currently. [EF]      Amount and/or Complexity of Data Reviewed  Radiology:  Decision-making details documented in ED Course.    Risk  Prescription drug management.               ED Course as of 11/13/23 2047 Mon Nov 13, 2023   1606 X-Ray  Chest PA And Lateral [EF]   1606 BP: 139/80 [EF]   1606 Temp: 98.4 °F (36.9 °C) [EF]   1606 Temp Source: Oral [EF]   1606 Pulse: 86 [EF]   1606 Resp: 20 [EF]   1606 SpO2: 95 % [EF]   1625 Healthy 63-year-old female presents to the ER after an episode of choking likely aspiration on a pill that broke in her throat.  She does not feel any retained foreign body sensation.  I do not suspect obtained esophageal pill fragments.  She feels much better at this time.  She thought she needed antibiotics.  I told her that would be almost unheard of to develop an aspiration pneumonia after an event like this.  Out of an abundance of caution because she has an upcoming trip I wrote her Augmentin but told her to take this only if she develops symptoms such as a productive cough or fever or pleuritic chest pain or shortness of breath.  Return to the ER for any concerns.  Do not take these antibiotics unless she becomes symptomatic.  She is well-appearing at this time and has no evidence of any airway compromise currently. [EF]      ED Course User Index  [EF] Alfonso Miller MD                    Clinical Impression:   Final diagnoses:  [T17.908A] Aspiration into airway  [T17.908A] Aspiration into airway, initial encounter (Primary)        ED Disposition Condition    Discharge Stable          ED Prescriptions       Medication Sig Dispense Start Date End Date Auth. Provider    amoxicillin-clavulanate 875-125mg (AUGMENTIN) 875-125 mg per tablet Take 1 tablet by mouth 2 (two) times daily. 14 tablet 11/13/2023 -- Alfonso Miller MD          Follow-up Information       Follow up With Specialties Details Why Contact Info Additional Information    [facility] Emergency Medicine  As needed, If symptoms worsen [address] [place] [address] [address] [place] [id number] 1st floor             Alfonso Miller MD  11/13/23 2047

## 2023-11-13 NOTE — TELEPHONE ENCOUNTER
Pt currently in the ED. Looks like rx was written. I spoke with pt, currently in the ED. Will await rx from ED provider.

## 2023-11-13 NOTE — FIRST PROVIDER EVALUATION
" Emergency Department TeleTriage Encounter Note      CHIEF COMPLAINT    Chief Complaint   Patient presents with    Cough    Vomiting     Possible aspiration of pill this am        VITAL SIGNS   Initial Vitals [11/13/23 1157]   BP Pulse Resp Temp SpO2   139/80 86 20 98.4 °F (36.9 °C) 95 %      MAP       --            ALLERGIES    Review of patient's allergies indicates:   Allergen Reactions    Fish containing products Hives, Itching, Swelling and Rash     Pt. States can eat shellfish.       PROVIDER TRIAGE NOTE  TeleTriage Note: Leticia Zelaya, a nontoxic/well appearing, 63 y.o. female, presented to the ED with c/o aspirating an iron supplement this morning. She states since then she has been coughing a lot and she feels something "rattling in my chest." Denies SOB. Speaking in full sentences and no signs of respiratory distress.    All ED beds are full at present; patient notified of this status.  Patient seen and medically screened by Nurse Practitioner via teletriage. Orders initiated at triage to expedite care.  Patient is stable to return to the waiting room and will be placed in an ED bed when available.  Care will be transferred to an alternate provider when patient has been placed in an Exam Room from the [facility] for physical exam, additional orders, and disposition.  12:07 PM Amelia Vasquez DNP, FNP-C        ORDERS  Labs Reviewed - No data to display    ED Orders (720h ago, onward)      Start Ordered     Status Ordering Provider    11/13/23 1209 11/13/23 1208  X-Ray Chest PA And Lateral  1 time imaging         Ordered AMELIA VASQUEZ              Virtual Visit Note: The provider triage portion of this emergency department evaluation and documentation was performed via [employer], a HIPAA-compliant telemedicine application, in concert with a tele-presenter in the room. A face to face patient evaluation with one of my colleagues will occur once the patient is placed in an emergency department " room.      DISCLAIMER: This note was prepared with [employer] voice recognition transcription software. Garbled syntax, mangled pronouns, and other bizarre constructions may be attributed to that software system.

## 2023-11-13 NOTE — ED NOTES
Assumed care:  Leticia Zelaya is awake, alert and oriented x 3, skin warm and dry, in NAD with family at bedside.  Patient states that she was taking her iron this morning and aspirated.  CO cough.    Patient identifiers for Leticia Zelaya checked and correct.  LOC:  Leticia Zelaya is awake, alert, and aware of environment with an appropriate affect. She is oriented x 3 and speaking appropriately.  APPEARANCE:  She is resting comfortably and in no acute distress. She is clean and well groomed, patient's clothing is properly fastened.  SKIN:  The skin is warm and dry. She has normal skin turgor and moist mucus membranes. Skin is intact; no bruising or breakdown noted.  MUSCULOSKELETAL:  She is moving all extremities well, no obvious deformities noted. Pulses intact.   RESPIRATORY:  Airway is open and patent. Respirations are spontaneous and non-labored with normal effort and rate.  cough  CARDIAC:  She has a normal rate and rhythm. No peripheral edema noted. Capillary refill < 3 seconds.  ABDOMEN:  No distention noted.  Soft and non-tender upon palpation.  NEUROLOGICAL:  PERRL. Facial expression is symmetrical. Hand grasps are equal bilaterally. Normal sensation in all extremities when touched with finger.  Allergies reported:    Review of patient's allergies indicates:   Allergen Reactions    Fish containing products Hives, Itching, Swelling and Rash     Pt. States can eat shellfish.

## 2023-12-08 ENCOUNTER — LAB VISIT (OUTPATIENT)
Dept: LAB | Facility: HOSPITAL | Age: 63
End: 2023-12-08
Payer: COMMERCIAL

## 2023-12-08 ENCOUNTER — OFFICE VISIT (OUTPATIENT)
Dept: FAMILY MEDICINE | Facility: CLINIC | Age: 63
End: 2023-12-08
Payer: COMMERCIAL

## 2023-12-08 VITALS
SYSTOLIC BLOOD PRESSURE: 130 MMHG | HEIGHT: 67 IN | RESPIRATION RATE: 16 BRPM | HEART RATE: 66 BPM | OXYGEN SATURATION: 97 % | WEIGHT: 200.38 LBS | TEMPERATURE: 98 F | DIASTOLIC BLOOD PRESSURE: 76 MMHG | BODY MASS INDEX: 31.45 KG/M2

## 2023-12-08 DIAGNOSIS — F33.2 SEVERE EPISODE OF RECURRENT MAJOR DEPRESSIVE DISORDER, WITHOUT PSYCHOTIC FEATURES: ICD-10-CM

## 2023-12-08 DIAGNOSIS — Z00.00 WELLNESS EXAMINATION: Primary | ICD-10-CM

## 2023-12-08 DIAGNOSIS — Z00.00 WELLNESS EXAMINATION: ICD-10-CM

## 2023-12-08 LAB
ALBUMIN SERPL BCP-MCNC: 3.6 G/DL (ref 3.5–5.2)
ALP SERPL-CCNC: 92 U/L (ref 55–135)
ALT SERPL W/O P-5'-P-CCNC: 32 U/L (ref 10–44)
ANION GAP SERPL CALC-SCNC: 8 MMOL/L (ref 8–16)
AST SERPL-CCNC: 31 U/L (ref 10–40)
BASOPHILS # BLD AUTO: 0.06 K/UL (ref 0–0.2)
BASOPHILS NFR BLD: 1 % (ref 0–1.9)
BILIRUB SERPL-MCNC: 0.3 MG/DL (ref 0.1–1)
BUN SERPL-MCNC: 15 MG/DL (ref 8–23)
CALCIUM SERPL-MCNC: 9.9 MG/DL (ref 8.7–10.5)
CHLORIDE SERPL-SCNC: 103 MMOL/L (ref 95–110)
CHOLEST SERPL-MCNC: 231 MG/DL (ref 120–199)
CHOLEST/HDLC SERPL: 4 {RATIO} (ref 2–5)
CO2 SERPL-SCNC: 29 MMOL/L (ref 23–29)
CREAT SERPL-MCNC: 0.8 MG/DL (ref 0.5–1.4)
DIFFERENTIAL METHOD: ABNORMAL
EOSINOPHIL # BLD AUTO: 0.2 K/UL (ref 0–0.5)
EOSINOPHIL NFR BLD: 3.9 % (ref 0–8)
ERYTHROCYTE [DISTWIDTH] IN BLOOD BY AUTOMATED COUNT: 13.5 % (ref 11.5–14.5)
EST. GFR  (NO RACE VARIABLE): >60 ML/MIN/1.73 M^2
FERRITIN SERPL-MCNC: 152 NG/ML (ref 20–300)
GLUCOSE SERPL-MCNC: 75 MG/DL (ref 70–110)
HCT VFR BLD AUTO: 42.4 % (ref 37–48.5)
HDLC SERPL-MCNC: 58 MG/DL (ref 40–75)
HDLC SERPL: 25.1 % (ref 20–50)
HGB BLD-MCNC: 13.1 G/DL (ref 12–16)
IMM GRANULOCYTES # BLD AUTO: 0.02 K/UL (ref 0–0.04)
IMM GRANULOCYTES NFR BLD AUTO: 0.3 % (ref 0–0.5)
IRON SERPL-MCNC: 72 UG/DL (ref 30–160)
LDLC SERPL CALC-MCNC: 138.8 MG/DL (ref 63–159)
LYMPHOCYTES # BLD AUTO: 2 K/UL (ref 1–4.8)
LYMPHOCYTES NFR BLD: 33.8 % (ref 18–48)
MCH RBC QN AUTO: 28 PG (ref 27–31)
MCHC RBC AUTO-ENTMCNC: 30.9 G/DL (ref 32–36)
MCV RBC AUTO: 91 FL (ref 82–98)
MONOCYTES # BLD AUTO: 0.5 K/UL (ref 0.3–1)
MONOCYTES NFR BLD: 7.8 % (ref 4–15)
NEUTROPHILS # BLD AUTO: 3.1 K/UL (ref 1.8–7.7)
NEUTROPHILS NFR BLD: 53.2 % (ref 38–73)
NONHDLC SERPL-MCNC: 173 MG/DL
NRBC BLD-RTO: 0 /100 WBC
PLATELET # BLD AUTO: 389 K/UL (ref 150–450)
PMV BLD AUTO: 9.7 FL (ref 9.2–12.9)
POTASSIUM SERPL-SCNC: 4 MMOL/L (ref 3.5–5.1)
PROT SERPL-MCNC: 7.1 G/DL (ref 6–8.4)
RBC # BLD AUTO: 4.68 M/UL (ref 4–5.4)
SATURATED IRON: 20 % (ref 20–50)
SODIUM SERPL-SCNC: 140 MMOL/L (ref 136–145)
T4 FREE SERPL-MCNC: 0.86 NG/DL (ref 0.71–1.51)
TOTAL IRON BINDING CAPACITY: 367 UG/DL (ref 250–450)
TRANSFERRIN SERPL-MCNC: 248 MG/DL (ref 200–375)
TRIGL SERPL-MCNC: 171 MG/DL (ref 30–150)
TSH SERPL DL<=0.005 MIU/L-ACNC: 0.37 UIU/ML (ref 0.4–4)
WBC # BLD AUTO: 5.86 K/UL (ref 3.9–12.7)

## 2023-12-08 PROCEDURE — 3075F SYST BP GE 130 - 139MM HG: CPT | Mod: CPTII,S$GLB,,

## 2023-12-08 PROCEDURE — 3078F PR MOST RECENT DIASTOLIC BLOOD PRESSURE < 80 MM HG: ICD-10-PCS | Mod: CPTII,S$GLB,,

## 2023-12-08 PROCEDURE — 1159F MED LIST DOCD IN RCRD: CPT | Mod: CPTII,S$GLB,,

## 2023-12-08 PROCEDURE — 99397 PR PREVENTIVE VISIT,EST,65 & OVER: ICD-10-PCS | Mod: S$GLB,,,

## 2023-12-08 PROCEDURE — 80061 LIPID PANEL: CPT

## 2023-12-08 PROCEDURE — 1101F PR PT FALLS ASSESS DOC 0-1 FALLS W/OUT INJ PAST YR: ICD-10-PCS | Mod: CPTII,S$GLB,,

## 2023-12-08 PROCEDURE — 3078F DIAST BP <80 MM HG: CPT | Mod: CPTII,S$GLB,,

## 2023-12-08 PROCEDURE — 82728 ASSAY OF FERRITIN: CPT

## 2023-12-08 PROCEDURE — 3075F PR MOST RECENT SYSTOLIC BLOOD PRESS GE 130-139MM HG: ICD-10-PCS | Mod: CPTII,S$GLB,,

## 2023-12-08 PROCEDURE — 85025 COMPLETE CBC W/AUTO DIFF WBC: CPT

## 2023-12-08 PROCEDURE — 83036 HEMOGLOBIN GLYCOSYLATED A1C: CPT

## 2023-12-08 PROCEDURE — 3288F PR FALLS RISK ASSESSMENT DOCUMENTED: ICD-10-PCS | Mod: CPTII,S$GLB,,

## 2023-12-08 PROCEDURE — 84439 ASSAY OF FREE THYROXINE: CPT

## 2023-12-08 PROCEDURE — 1160F RVW MEDS BY RX/DR IN RCRD: CPT | Mod: CPTII,S$GLB,,

## 2023-12-08 PROCEDURE — 84466 ASSAY OF TRANSFERRIN: CPT

## 2023-12-08 PROCEDURE — 84443 ASSAY THYROID STIM HORMONE: CPT

## 2023-12-08 PROCEDURE — 99999 PR PBB SHADOW E&M-EST. PATIENT-LVL V: ICD-10-PCS | Mod: PBBFAC,,,

## 2023-12-08 PROCEDURE — 36415 COLL VENOUS BLD VENIPUNCTURE: CPT | Mod: PO

## 2023-12-08 PROCEDURE — 3008F PR BODY MASS INDEX (BMI) DOCUMENTED: ICD-10-PCS | Mod: CPTII,S$GLB,,

## 2023-12-08 PROCEDURE — 3288F FALL RISK ASSESSMENT DOCD: CPT | Mod: CPTII,S$GLB,,

## 2023-12-08 PROCEDURE — 3008F BODY MASS INDEX DOCD: CPT | Mod: CPTII,S$GLB,,

## 2023-12-08 PROCEDURE — 80053 COMPREHEN METABOLIC PANEL: CPT

## 2023-12-08 PROCEDURE — 1101F PT FALLS ASSESS-DOCD LE1/YR: CPT | Mod: CPTII,S$GLB,,

## 2023-12-08 PROCEDURE — 1160F PR REVIEW ALL MEDS BY PRESCRIBER/CLIN PHARMACIST DOCUMENTED: ICD-10-PCS | Mod: CPTII,S$GLB,,

## 2023-12-08 PROCEDURE — 1159F PR MEDICATION LIST DOCUMENTED IN MEDICAL RECORD: ICD-10-PCS | Mod: CPTII,S$GLB,,

## 2023-12-08 PROCEDURE — 99999 PR PBB SHADOW E&M-EST. PATIENT-LVL V: CPT | Mod: PBBFAC,,,

## 2023-12-08 PROCEDURE — 99397 PER PM REEVAL EST PAT 65+ YR: CPT | Mod: S$GLB,,,

## 2023-12-08 PROCEDURE — 83540 ASSAY OF IRON: CPT

## 2023-12-08 RX ORDER — SCOLOPAMINE TRANSDERMAL SYSTEM 1 MG/1
PATCH, EXTENDED RELEASE TRANSDERMAL
COMMUNITY
Start: 2023-10-14

## 2023-12-08 RX ORDER — CHLORHEXIDINE GLUCONATE ORAL RINSE 1.2 MG/ML
SOLUTION DENTAL
COMMUNITY
Start: 2023-08-28

## 2023-12-08 RX ORDER — BACLOFEN 10 MG/1
10 TABLET ORAL
COMMUNITY
Start: 2023-11-27

## 2023-12-08 NOTE — PATIENT INSTRUCTIONS
Laurent Kelly,     If you are due for any health screening(s) below please notify me so we can arrange them to be ordered and scheduled to maintain your health. Most healthy patients complete it. Don't lose out on improving your health.     Tests to Keep You Healthy    Mammogram: SCHEDULED  Colon Cancer Screening: Met on 8/11/2022  Last Blood Pressure <= 139/89 (12/8/2023): Yes      Breast Cancer Screening    Breast cancer is the second most common cancer in women after skin cancer, and the second leading cause of death from cancer after lung cancer. Mammograms can detect breast cancer early, which significantly increases the chances of curing the cancer.      A screening mammogram is an x-ray image of the breasts used for early breast cancer detection. It can help reduce the number of deaths from breast cancer among women. To get a clear image, the breast is placed between two plastic plates to make it flat. How often a mammogram is needed depends on your age and your breast cancer risk.

## 2023-12-08 NOTE — PROGRESS NOTES
Subjective:       Patient ID: Leticia Zelaya is a 63 y.o. female.    Chief Complaint: Annual Exam    Presents to the clinic for annual wellness exam. No acute health complaints at this time.    Following with psychiatry routinely for PTSD, depression, anxiety. Continues to deal with N,V,D.     She wishes to check labs today to see if she needs to continue potassium and iron supplements. Wonders if she needs to stop omeprazole. Plan was to only continue 6 months. Will hold and see if her symptoms worsen.         Past Medical History:   Diagnosis Date    Anemia     Anxiety     Colon polyp     Depression     Depression     Hypertension     PONV (postoperative nausea and vomiting)     PTSD (post-traumatic stress disorder)        Review of patient's allergies indicates:   Allergen Reactions    Fish containing products Hives, Itching, Swelling and Rash     Pt. States can eat shellfish.         Current Outpatient Medications:     ALPRAZolam (XANAX) 0.25 MG tablet, TK 1/2 TO 1 T PO UP TO BID PRN, Disp: , Rfl:     baclofen (LIORESAL) 10 MG tablet, Take 10 mg by mouth., Disp: , Rfl:     chlorhexidine (PERIDEX) 0.12 % solution, SMARTSIG:By Mouth, Disp: , Rfl:     cholestyramine (QUESTRAN) 4 gram packet, MIX AND DRINK 1 PACKET(4 GRAMS) BY MOUTH EVERY DAY, Disp: 90 packet, Rfl: 0    cyclobenzaprine (FLEXERIL) 5 MG tablet, TAKE 1 TABLET(5 MG) BY MOUTH THREE TIMES DAILY AS NEEDED FOR MUSCLE SPASMS, Disp: 30 tablet, Rfl: 1    FLUoxetine (PROZAC) 20 mg/5 mL (4 mg/mL) solution, SMARTSIG:Milliliter(s) By Mouth, Disp: , Rfl:     fluticasone propionate (FLONASE) 50 mcg/actuation nasal spray, by Each Nostril route., Disp: , Rfl:     gabapentin (NEURONTIN) 100 MG capsule, Take 100 mg by mouth 3 (three) times daily., Disp: , Rfl:     HYDROcodone-acetaminophen (NORCO) 5-325 mg per tablet, Take 1 tablet by mouth nightly as needed for Pain., Disp: 28 tablet, Rfl: 0    mirtazapine (REMERON) 15 MG tablet, Take 15 mg by mouth every evening.,  Disp: , Rfl:     NURTEC 75 mg odt, PLACE 1 T ON THE TONGUE AND ALLOW TO DISSOLVE ONCE D PRN, Disp: , Rfl:     omega-3 acid ethyl esters (LOVAZA) 1 gram capsule, TK 2 CS PO QD, Disp: , Rfl:     omeprazole (PRILOSEC) 40 MG capsule, Take 1 capsule (40 mg total) by mouth once daily., Disp: 90 capsule, Rfl: 1    ondansetron (ZOFRAN-ODT) 8 MG TbDL, Take 1 tablet (8 mg total) by mouth every 8 (eight) hours as needed., Disp: 30 tablet, Rfl: 3    potassium chloride SA (K-DUR,KLOR-CON) 20 MEQ tablet, TAKE 1 TABLET(20 MEQ) BY MOUTH TWICE DAILY, Disp: 180 tablet, Rfl: 4    promethazine (PHENERGAN) 12.5 MG Tab, Take 1 tablet (12.5 mg total) by mouth every 6 (six) hours as needed (nausea/vomiting)., Disp: 30 tablet, Rfl: 0    rizatriptan (MAXALT-MLT) 10 MG disintegrating tablet, PLACE 1 T ON THE TONGUE AND ALLOW TO DISSOLVE ONE TIME MAY REPEAT ONCE IN  2 H X2. MAX DAILY DOSE 30MG/24H, Disp: , Rfl:     scopolamine (TRANSDERM-SCOP) 1.3-1.5 mg (1 mg over 3 days), UNWRAP AND APPLY 1 PATCH TO SKIN BEHIND EAR 2 HOURS PRIOR TO APPOINTMENT, Disp: , Rfl:     SUMAtriptan (IMITREX) 20 mg/actuation nasal spray, , Disp: , Rfl:     triamterene-hydrochlorothiazide 37.5-25 mg (MAXZIDE-25) 37.5-25 mg per tablet, Take 1 tablet by mouth once daily., Disp: 90 tablet, Rfl: 4    ZINC PICOLINATE ORAL, Take by mouth. Take 1 tab each morning., Disp: , Rfl:     estradioL (ESTRACE) 0.01 % (0.1 mg/gram) vaginal cream, Place 2 g vaginally every evening for 14 days, THEN 1 g every evening for 14 days, THEN 1 g twice a week for 14 days., Disp: 42.5 g, Rfl: 1  No current facility-administered medications for this visit.    Facility-Administered Medications Ordered in Other Visits:     lactated ringers infusion, , Intravenous, Continuous, Jeff Velasquez MD, Last Rate: 25 mL/hr at 08/15/23 0800, New Bag at 08/15/23 0800    LIDOcaine (PF) 10 mg/ml (1%) injection 10 mg, 1 mL, Intradermal, Once, Jeff Velasquez MD    Review of Systems   Gastrointestinal:  Positive for  "diarrhea, nausea and vomiting.   Psychiatric/Behavioral:  Positive for dysphoric mood. The patient is nervous/anxious.        Objective:      /76 (BP Location: Right arm, Patient Position: Sitting, BP Method: Medium (Manual))   Pulse 66   Temp 97.8 °F (36.6 °C) (Oral)   Resp 16   Ht 5' 7" (1.702 m)   Wt 90.9 kg (200 lb 6.4 oz)   SpO2 97%   BMI 31.39 kg/m²   Physical Exam  Vitals reviewed.   Constitutional:       General: She is not in acute distress.     Appearance: Normal appearance. She is obese. She is not ill-appearing, toxic-appearing or diaphoretic.   HENT:      Head: Normocephalic.      Right Ear: External ear normal.      Left Ear: External ear normal.      Nose: Nose normal. No congestion or rhinorrhea.      Mouth/Throat:      Mouth: Mucous membranes are moist.      Pharynx: Oropharynx is clear.   Eyes:      General: No scleral icterus.        Right eye: No discharge.         Left eye: No discharge.      Extraocular Movements: Extraocular movements intact.      Conjunctiva/sclera: Conjunctivae normal.   Cardiovascular:      Rate and Rhythm: Normal rate and regular rhythm.      Pulses: Normal pulses.      Heart sounds: Normal heart sounds. No murmur heard.     No friction rub. No gallop.   Pulmonary:      Effort: Pulmonary effort is normal. No respiratory distress.      Breath sounds: Normal breath sounds. No wheezing, rhonchi or rales.   Chest:      Chest wall: No tenderness.   Musculoskeletal:         General: No swelling, tenderness or deformity. Normal range of motion.      Cervical back: Normal range of motion.      Right lower leg: No edema.      Left lower leg: No edema.   Skin:     General: Skin is warm and dry.      Capillary Refill: Capillary refill takes less than 2 seconds.      Coloration: Skin is not jaundiced.      Findings: No bruising, erythema, lesion or rash.   Neurological:      Mental Status: She is alert and oriented to person, place, and time.      Gait: Gait normal. "   Psychiatric:         Mood and Affect: Mood normal.         Behavior: Behavior normal.         Thought Content: Thought content normal.         Judgment: Judgment normal.         Assessment:       1. Wellness examination    2. Severe episode of recurrent major depressive disorder, without psychotic features        Plan:       Wellness examination  -     Comprehensive Metabolic Panel; Future; Expected date: 12/08/2023  -     Hemoglobin A1C; Future; Expected date: 12/08/2023  -     Lipid Panel; Future; Expected date: 12/08/2023  -     IRON AND TIBC; Future; Expected date: 12/08/2023  -     FERRITIN; Future; Expected date: 12/08/2023  -     CBC W/ AUTO DIFFERENTIAL; Future; Expected date: 12/08/2023  -     TSH; Future; Expected date: 12/08/2023    Severe episode of recurrent major depressive disorder, without psychotic features        -    Stable currently. Psych making adjustments and seeing her routinely. Continue meds. Will continue to monitor.                  Jabari Conley PA-C  Family Medicine Physician Assistant       Future Appointments       Date Specialty Appt Notes    12/29/2023 Radiology Encounter for screening mammogram for malignant neoplasm of breast [Z12.31]               I spent a total of 20 minutes on the day of the visit.This includes face to face time and non-face to face time preparing to see the patient (eg, review of tests), obtaining and/or reviewing separately obtained history, documenting clinical information in the electronic or other health record, independently interpreting results and communicating results to the patient/family/caregiver, or care coordinator.      We have addressed [3] Low: 2 or more self-limited or minor problems / 1 stable chronic illness / 1 acute, uncomplicated illness or injury  The complexity of the data reviewed and analyzed for this visit was [3] Limited (Reviewed prior external note, ordered unique testing or reviewed the results of each unique test)   The  risk of complications and/or morbidity or mortality are [4] Moderate risk (I.e. prescription drug management / decision regarding minor surgery with identified pt or procedure risk factors / decision regarding elective major surgery without identified pt or procedure risk factors / diagnosis or treatment significantly limited by social determinants of health)   The level of Medical Decision Making for this visit is [3] Low

## 2023-12-09 LAB
ESTIMATED AVG GLUCOSE: 111 MG/DL (ref 68–131)
HBA1C MFR BLD: 5.5 % (ref 4–5.6)

## 2023-12-20 ENCOUNTER — PATIENT MESSAGE (OUTPATIENT)
Dept: FAMILY MEDICINE | Facility: CLINIC | Age: 63
End: 2023-12-20
Payer: COMMERCIAL

## 2023-12-20 NOTE — TELEPHONE ENCOUNTER
LOV--12-8-23. Please see attached portal message from patient. Last bone density performed on 1-10-23.  Health maintenance records indicate next bone density due 1-10-26 (every 3 years).  Please see below for further details:        Please advise. Thank you.

## 2023-12-23 DIAGNOSIS — R03.0 ELEVATED BLOOD PRESSURE READING: ICD-10-CM

## 2023-12-23 NOTE — TELEPHONE ENCOUNTER
No care due was identified.  [facility] Embedded Care Due Messages. Reference number: [id number].   12/23/2023 5:52:03 AM CST

## 2023-12-26 RX ORDER — TRIAMTERENE/HYDROCHLOROTHIAZID 37.5-25 MG
1 TABLET ORAL
Qty: 90 TABLET | Refills: 3 | Status: SHIPPED | OUTPATIENT
Start: 2023-12-26

## 2024-02-23 ENCOUNTER — PATIENT MESSAGE (OUTPATIENT)
Dept: FAMILY MEDICINE | Facility: CLINIC | Age: 64
End: 2024-02-23
Payer: MEDICARE

## 2024-03-08 ENCOUNTER — PATIENT OUTREACH (OUTPATIENT)
Dept: ADMINISTRATIVE | Facility: HOSPITAL | Age: 64
End: 2024-03-08
Payer: MEDICARE

## 2024-03-08 NOTE — PROGRESS NOTES
Population Health Chart Review & Patient Outreach Details          Health Maintenance Topics Overdue:      VBHM Score: 0     Patient is not due for any topics at this time.    Tetanus Vaccine                  Health Maintenance Topic(s) Outreach Outcomes & Actions Taken:    Breast Cancer Screening - Outreach Outcomes & Actions Taken  : Mammogram Screening Scheduled

## 2024-03-26 DIAGNOSIS — Z00.00 ENCOUNTER FOR MEDICARE ANNUAL WELLNESS EXAM: ICD-10-CM

## 2024-04-03 DIAGNOSIS — E87.6 HYPOKALEMIA: ICD-10-CM

## 2024-04-03 DIAGNOSIS — K29.70 GASTRITIS, PRESENCE OF BLEEDING UNSPECIFIED, UNSPECIFIED CHRONICITY, UNSPECIFIED GASTRITIS TYPE: ICD-10-CM

## 2024-04-03 DIAGNOSIS — R03.0 ELEVATED BLOOD PRESSURE READING: ICD-10-CM

## 2024-04-03 NOTE — TELEPHONE ENCOUNTER
Care Due:                  Date            Visit Type   Department     Provider  --------------------------------------------------------------------------------                                MYCHART                              ANNUAL                              CHECKUP/PHY  [facility]    Josh Luis  Last Visit: 12-      S            MEDICINE       Madai  Next Visit: None Scheduled  None         None Found                                                            Last  Test          Frequency    Reason                     Performed    Due Date  --------------------------------------------------------------------------------    Office Visit  15 months..  omeprazole...............  12- 03-    Health [facility] Embedded Care Due Messages. Reference number: [id number].   4/03/2024 9:12:29 AM CDT

## 2024-04-04 ENCOUNTER — HOSPITAL ENCOUNTER (OUTPATIENT)
Dept: RADIOLOGY | Facility: CLINIC | Age: 64
Discharge: HOME | End: 2024-04-04
Attending: STUDENT IN AN ORGANIZED HEALTH CARE EDUCATION/TRAINING PROGRAM
Payer: MEDICARE

## 2024-04-04 DIAGNOSIS — Z12.31 ENCOUNTER FOR SCREENING MAMMOGRAM FOR BREAST CANCER: ICD-10-CM

## 2024-04-04 PROCEDURE — 77067 SCR MAMMO BI INCL CAD: CPT | Mod: TC,HCNC,PO

## 2024-04-04 PROCEDURE — 77067 SCR MAMMO BI INCL CAD: CPT | Mod: 26,HCNC,, | Performed by: RADIOLOGY

## 2024-04-04 PROCEDURE — 77063 BREAST TOMOSYNTHESIS BI: CPT | Mod: 26,HCNC,, | Performed by: RADIOLOGY

## 2024-04-05 RX ORDER — TRIAMTERENE/HYDROCHLOROTHIAZID 37.5-25 MG
1 TABLET ORAL
Qty: 90 TABLET | Refills: 3 | Status: SHIPPED | OUTPATIENT
Start: 2024-04-05

## 2024-04-05 RX ORDER — OMEPRAZOLE 40 MG/1
40 CAPSULE, DELAYED RELEASE ORAL
Qty: 90 CAPSULE | Refills: 3 | Status: SHIPPED | OUTPATIENT
Start: 2024-04-05

## 2024-04-05 RX ORDER — POTASSIUM CHLORIDE 20 MEQ/1
20 TABLET, EXTENDED RELEASE ORAL 2 TIMES DAILY
Qty: 180 TABLET | Refills: 3 | Status: SHIPPED | OUTPATIENT
Start: 2024-04-05

## 2024-04-30 ENCOUNTER — OFFICE VISIT (OUTPATIENT)
Dept: FAMILY MEDICINE | Facility: CLINIC | Age: 64
End: 2024-04-30
Payer: MEDICARE

## 2024-04-30 VITALS
TEMPERATURE: 98 F | SYSTOLIC BLOOD PRESSURE: 119 MMHG | BODY MASS INDEX: 33.8 KG/M2 | HEIGHT: 67 IN | OXYGEN SATURATION: 95 % | HEART RATE: 64 BPM | DIASTOLIC BLOOD PRESSURE: 69 MMHG | WEIGHT: 215.38 LBS

## 2024-04-30 DIAGNOSIS — F33.2 SEVERE EPISODE OF RECURRENT MAJOR DEPRESSIVE DISORDER, WITHOUT PSYCHOTIC FEATURES: ICD-10-CM

## 2024-04-30 DIAGNOSIS — Z00.00 ENCOUNTER FOR PREVENTIVE HEALTH EXAMINATION: Primary | ICD-10-CM

## 2024-04-30 DIAGNOSIS — I10 ESSENTIAL HYPERTENSION: ICD-10-CM

## 2024-04-30 DIAGNOSIS — Z00.00 ENCOUNTER FOR MEDICARE ANNUAL WELLNESS EXAM: ICD-10-CM

## 2024-04-30 PROCEDURE — 99999 PR PBB SHADOW E&M-EST. PATIENT-LVL V: CPT | Mod: PBBFAC,HCNC,, | Performed by: NURSE PRACTITIONER

## 2024-04-30 PROCEDURE — 1160F RVW MEDS BY RX/DR IN RCRD: CPT | Mod: HCNC,CPTII,S$GLB, | Performed by: NURSE PRACTITIONER

## 2024-04-30 PROCEDURE — 1159F MED LIST DOCD IN RCRD: CPT | Mod: HCNC,CPTII,S$GLB, | Performed by: NURSE PRACTITIONER

## 2024-04-30 PROCEDURE — 3288F FALL RISK ASSESSMENT DOCD: CPT | Mod: HCNC,CPTII,S$GLB, | Performed by: NURSE PRACTITIONER

## 2024-04-30 PROCEDURE — 3078F DIAST BP <80 MM HG: CPT | Mod: HCNC,CPTII,S$GLB, | Performed by: NURSE PRACTITIONER

## 2024-04-30 PROCEDURE — 1158F ADVNC CARE PLAN TLK DOCD: CPT | Mod: HCNC,CPTII,S$GLB, | Performed by: NURSE PRACTITIONER

## 2024-04-30 PROCEDURE — 3074F SYST BP LT 130 MM HG: CPT | Mod: HCNC,CPTII,S$GLB, | Performed by: NURSE PRACTITIONER

## 2024-04-30 PROCEDURE — 1170F FXNL STATUS ASSESSED: CPT | Mod: HCNC,CPTII,S$GLB, | Performed by: NURSE PRACTITIONER

## 2024-04-30 PROCEDURE — 1125F AMNT PAIN NOTED PAIN PRSNT: CPT | Mod: HCNC,CPTII,S$GLB, | Performed by: NURSE PRACTITIONER

## 2024-04-30 PROCEDURE — G0439 PPPS, SUBSEQ VISIT: HCPCS | Mod: HCNC,S$GLB,, | Performed by: NURSE PRACTITIONER

## 2024-04-30 PROCEDURE — 1101F PT FALLS ASSESS-DOCD LE1/YR: CPT | Mod: HCNC,CPTII,S$GLB, | Performed by: NURSE PRACTITIONER

## 2024-04-30 RX ORDER — PROPRANOLOL HYDROCHLORIDE 10 MG/1
10 TABLET ORAL DAILY PRN
COMMUNITY
Start: 2024-04-17

## 2024-04-30 NOTE — PATIENT INSTRUCTIONS
Counseling and Referral of Other Preventative  (Italic type indicates deductible and co-insurance are waived)    Patient Name: Leticia Zelaya  Today's Date: 4/30/2024    Health Maintenance       Date Due Completion Date    Hepatitis C Screening Never done ---    TETANUS VACCINE Never done ---    COVID-19 Vaccine (4 - 2023-24 season) 09/01/2023 4/13/2022    Mammogram 04/04/2025 4/4/2024    DEXA Scan 01/10/2026 1/10/2023    Hemoglobin A1c (Diabetic Prevention Screening) 12/08/2026 12/8/2023    Lipid Panel 12/08/2028 12/8/2023    Colorectal Cancer Screening 08/11/2032 8/11/2022    Override on 10/13/2020: Done        No orders of the defined types were placed in this encounter.    The following information is provided to all patients.  This information is to help you find resources for any of the problems found today that may be affecting your health:                  Living healthy guide: www.[facility].[place].[facility]      Understanding Diabetes: www.diabetes.org      Eating healthy: www.cdc.gov/healthyweight      CDC home safety checklist: www.cdc.gov/steadi/patient.html      Agency on Aging: www.goea.[place].gov      Alcoholics anonymous (AA): www.aa.org      Physical Activity: www.chantal.nih.gov/eu0zfxl      Tobacco use: www.quitwithusla.org

## 2024-04-30 NOTE — PROGRESS NOTES
"  Leticia Zelaya presented for a  Medicare AWV and comprehensive Health Risk Assessment today. The following components were reviewed and updated:    Medical history  Family History  Social history  Allergies and Current Medications  Health Risk Assessment  Health Maintenance  Care Team         ** See Completed Assessments for Annual Wellness Visit within the encounter summary.**         The following assessments were completed:  Living Situation  CAGE  Depression Screening  Timed Get Up and Go  Whisper Test  Cognitive Function Screening  Nutrition Screening  ADL Screening  PAQ Screening    Clock in media   Opioid documentation:      Patient does not have a current opioid prescription.        Vitals:    04/30/24 1024   BP: 119/69   Pulse: 64   Temp: 98.3 °F (36.8 °C)   TempSrc: Oral   SpO2: 95%   Weight: 97.7 kg (215 lb 6.2 oz)   Height: 5' 7" (1.702 m)     Body mass index is 33.73 kg/m².  Physical Exam  Constitutional:       Appearance: She is well-developed.   HENT:      Head: Normocephalic and atraumatic.      Nose: Nose normal.   Eyes:      General: Lids are normal.      Conjunctiva/sclera: Conjunctivae normal.   Cardiovascular:      Rate and Rhythm: Normal rate.   Pulmonary:      Effort: Pulmonary effort is normal.   Neurological:      Mental Status: She is alert and oriented to person, place, and time.   Psychiatric:         Speech: Speech normal.         Behavior: Behavior normal.               Diagnoses and health risks identified today and associated recommendations/orders:    1. Encounter for Medicare annual wellness exam  Discussed health maintenance guidelines appropriate for age.      - Ambulatory Referral/Consult to Enhanced Annual Wellness Visit (eAWV)    2. Encounter for preventive health examination      3. Severe episode of recurrent major depressive disorder, without psychotic features  Uncontrolled with phq 9 score of 22  Patient is taking medications and followed by psychiatry and therapy  She " "does not have current thoughts or plans of hurting herself, admits that "sometimes" she feels "things would be easier " if she was not here   ED precautions given  Follow up with psychiatry as planned   - Ambulatory referral/consult to Psychiatry; Future    4. Essential hypertension  Controlled, continue current medication regimen  Low salt diet  Increase physical activity  Followed by pcp        Provided Leticia with a 5-10 year written screening schedule and personal prevention plan. Recommendations were developed using the USPSTF age appropriate recommendations. Education, counseling, and referrals were provided as needed. After Visit Summary printed and given to patient which includes a list of additional screenings\tests needed.    Follow up for One year for Annual Wellness Visit.    Vicky Robertson, NP      I offered to discuss advanced care planning, including how to pick a person who would make decisions for you if you were unable to make them for yourself, called a health care power of , and what kind of decisions you might make such as use of life sustaining treatments such as ventilators and tube feeding when faced with a life limiting illness recorded on a living will that they will need to know. (How you want to be cared for as you near the end of your natural life)     X  Patient has advanced directives written and agrees to provide copies to the institution.  "

## 2024-06-13 ENCOUNTER — TELEPHONE (OUTPATIENT)
Dept: PSYCHIATRY | Facility: CLINIC | Age: 64
End: 2024-06-13
Payer: MEDICARE

## 2024-06-13 NOTE — TELEPHONE ENCOUNTER
Called to verify patient would like to be placed on the med management wait list. No answer,phone picks up and disconnects, sent my chart message.

## 2024-11-25 ENCOUNTER — HOSPITAL ENCOUNTER (OUTPATIENT)
Dept: RADIOLOGY | Facility: CLINIC | Age: 64
Discharge: HOME | End: 2024-11-25
Payer: MEDICARE

## 2024-11-25 ENCOUNTER — OFFICE VISIT (OUTPATIENT)
Dept: FAMILY MEDICINE | Facility: CLINIC | Age: 64
End: 2024-11-25
Payer: MEDICARE

## 2024-11-25 VITALS
HEIGHT: 67 IN | OXYGEN SATURATION: 96 % | SYSTOLIC BLOOD PRESSURE: 120 MMHG | WEIGHT: 206.38 LBS | DIASTOLIC BLOOD PRESSURE: 70 MMHG | HEART RATE: 55 BPM | TEMPERATURE: 98 F | RESPIRATION RATE: 18 BRPM | BODY MASS INDEX: 32.39 KG/M2

## 2024-11-25 DIAGNOSIS — R05.3 PERSISTENT COUGH FOR 3 WEEKS OR LONGER: ICD-10-CM

## 2024-11-25 DIAGNOSIS — E78.00 HYPERCHOLESTEREMIA: ICD-10-CM

## 2024-11-25 DIAGNOSIS — J40 BRONCHITIS: ICD-10-CM

## 2024-11-25 DIAGNOSIS — R79.89 ABNORMAL TSH: ICD-10-CM

## 2024-11-25 DIAGNOSIS — E87.6 HYPOKALEMIA: ICD-10-CM

## 2024-11-25 DIAGNOSIS — E05.90 SUBCLINICAL HYPERTHYROIDISM: ICD-10-CM

## 2024-11-25 DIAGNOSIS — Z00.00 WELLNESS EXAMINATION: Primary | ICD-10-CM

## 2024-11-25 DIAGNOSIS — F33.2 SEVERE EPISODE OF RECURRENT MAJOR DEPRESSIVE DISORDER, WITHOUT PSYCHOTIC FEATURES: ICD-10-CM

## 2024-11-25 DIAGNOSIS — I70.0 AORTIC ATHEROSCLEROSIS: ICD-10-CM

## 2024-11-25 DIAGNOSIS — I10 ESSENTIAL HYPERTENSION: ICD-10-CM

## 2024-11-25 DIAGNOSIS — R06.2 WHEEZING: ICD-10-CM

## 2024-11-25 PROCEDURE — 3078F DIAST BP <80 MM HG: CPT | Mod: HCNC,CPTII,S$GLB,

## 2024-11-25 PROCEDURE — 71046 X-RAY EXAM CHEST 2 VIEWS: CPT | Mod: 26,HCNC,, | Performed by: RADIOLOGY

## 2024-11-25 PROCEDURE — 1126F AMNT PAIN NOTED NONE PRSNT: CPT | Mod: HCNC,CPTII,S$GLB,

## 2024-11-25 PROCEDURE — 3288F FALL RISK ASSESSMENT DOCD: CPT | Mod: HCNC,CPTII,S$GLB,

## 2024-11-25 PROCEDURE — 3074F SYST BP LT 130 MM HG: CPT | Mod: HCNC,CPTII,S$GLB,

## 2024-11-25 PROCEDURE — 1160F RVW MEDS BY RX/DR IN RCRD: CPT | Mod: HCNC,CPTII,S$GLB,

## 2024-11-25 PROCEDURE — 99214 OFFICE O/P EST MOD 30 MIN: CPT | Mod: HCNC,S$GLB,,

## 2024-11-25 PROCEDURE — 3008F BODY MASS INDEX DOCD: CPT | Mod: HCNC,CPTII,S$GLB,

## 2024-11-25 PROCEDURE — 1159F MED LIST DOCD IN RCRD: CPT | Mod: HCNC,CPTII,S$GLB,

## 2024-11-25 PROCEDURE — 71046 X-RAY EXAM CHEST 2 VIEWS: CPT | Mod: TC,HCNC,FY,PO

## 2024-11-25 PROCEDURE — G2211 COMPLEX E/M VISIT ADD ON: HCPCS | Mod: HCNC,S$GLB,,

## 2024-11-25 PROCEDURE — 99999 PR PBB SHADOW E&M-EST. PATIENT-LVL V: CPT | Mod: PBBFAC,HCNC,,

## 2024-11-25 PROCEDURE — 1101F PT FALLS ASSESS-DOCD LE1/YR: CPT | Mod: HCNC,CPTII,S$GLB,

## 2024-11-25 RX ORDER — ALBUTEROL SULFATE 90 UG/1
2 INHALANT RESPIRATORY (INHALATION) EVERY 6 HOURS PRN
Qty: 6.7 G | Refills: 2 | Status: SHIPPED | OUTPATIENT
Start: 2024-11-25

## 2024-11-25 RX ORDER — DOXYCYCLINE HYCLATE 100 MG
100 TABLET ORAL 2 TIMES DAILY
Qty: 14 TABLET | Refills: 0 | Status: SHIPPED | OUTPATIENT
Start: 2024-11-25 | End: 2024-12-02

## 2024-11-25 RX ORDER — PROMETHAZINE HYDROCHLORIDE AND DEXTROMETHORPHAN HYDROBROMIDE 6.25; 15 MG/5ML; MG/5ML
5 SYRUP ORAL NIGHTLY PRN
Qty: 118 ML | Refills: 0 | Status: SHIPPED | OUTPATIENT
Start: 2024-11-25 | End: 2024-12-05

## 2024-11-25 RX ORDER — GUAIFENESIN 600 MG/1
600 TABLET, EXTENDED RELEASE ORAL EVERY 12 HOURS
COMMUNITY
Start: 2024-11-18

## 2024-11-25 RX ORDER — METHYLPREDNISOLONE 4 MG/1
TABLET ORAL
Qty: 21 EACH | Refills: 0 | Status: SHIPPED | OUTPATIENT
Start: 2024-11-25 | End: 2024-12-16

## 2024-11-25 NOTE — PROGRESS NOTES
Subjective:       Patient ID: Leticia Zelaya is a 64 y.o. female.    Chief Complaint: Cough    Presents for annual. Also has been sick for a few weeks. Previously seen on UC for same. Azithro, steroid shot, mucinex DM since. Slight improvement in symptoms but they persist. See below.     Otherwise feeling well. Wishes to check labs today.     Cough  This is a new problem. The current episode started 1 to 4 weeks ago. The problem has been gradually improving. The problem occurs constantly. The cough is Productive of purulent sputum. Associated symptoms include chest pain, nasal congestion, postnasal drip, rhinorrhea, a sore throat, shortness of breath and wheezing. Pertinent negatives include no chills, ear congestion, ear pain, fever, headaches, heartburn, hemoptysis, myalgias, rash, sweats or weight loss. The symptoms are aggravated by lying down. She has tried OTC cough suppressant for the symptoms. The treatment provided moderate relief. Her past medical history is significant for bronchitis and pneumonia.       Past Medical History:   Diagnosis Date    Anemia     Anxiety     Colon polyp     Depression     Depression     Hypertension     PONV (postoperative nausea and vomiting)     PTSD (post-traumatic stress disorder)        Review of patient's allergies indicates:   Allergen Reactions    Fish containing products Hives, Itching, Swelling and Rash     Pt. States can eat shellfish.         Current Outpatient Medications:     ALPRAZolam (XANAX) 0.25 MG tablet, TK 1/2 TO 1 T PO UP TO BID PRN, Disp: , Rfl:     baclofen (LIORESAL) 10 MG tablet, Take 10 mg by mouth., Disp: , Rfl:     chlorhexidine (PERIDEX) 0.12 % solution, SMARTSIG:By Mouth, Disp: , Rfl:     cholestyramine (QUESTRAN) 4 gram packet, MIX AND DRINK 1 PACKET(4 GRAMS) BY MOUTH EVERY DAY, Disp: 90 packet, Rfl: 0    cyclobenzaprine (FLEXERIL) 5 MG tablet, TAKE 1 TABLET(5 MG) BY MOUTH THREE TIMES DAILY AS NEEDED FOR MUSCLE SPASMS, Disp: 30 tablet, Rfl: 1     FLUoxetine (PROZAC) 20 mg/5 mL (4 mg/mL) solution, SMARTSIG:Milliliter(s) By Mouth, Disp: , Rfl:     fluticasone propionate (FLONASE) 50 mcg/actuation nasal spray, by Each Nostril route., Disp: , Rfl:     gabapentin (NEURONTIN) 100 MG capsule, Take 100 mg by mouth 3 (three) times daily., Disp: , Rfl:     guaiFENesin (MUCINEX) 600 mg 12 hr tablet, Take 600 mg by mouth every 12 (twelve) hours., Disp: , Rfl:     mirtazapine (REMERON) 15 MG tablet, Take 15 mg by mouth every evening., Disp: , Rfl:     NURTEC 75 mg odt, PLACE 1 T ON THE TONGUE AND ALLOW TO DISSOLVE ONCE D PRN, Disp: , Rfl:     omega-3 acid ethyl esters (LOVAZA) 1 gram capsule, TK 2 CS PO QD, Disp: , Rfl:     omeprazole (PRILOSEC) 40 MG capsule, TAKE 1 CAPSULE EVERY DAY, Disp: 90 capsule, Rfl: 3    ondansetron (ZOFRAN-ODT) 8 MG TbDL, Take 1 tablet (8 mg total) by mouth every 8 (eight) hours as needed., Disp: 30 tablet, Rfl: 3    potassium chloride SA (K-DUR,KLOR-CON) 20 MEQ tablet, TAKE 1 TABLET TWICE DAILY, Disp: 180 tablet, Rfl: 3    promethazine (PHENERGAN) 12.5 MG Tab, Take 1 tablet (12.5 mg total) by mouth every 6 (six) hours as needed (nausea/vomiting)., Disp: 30 tablet, Rfl: 0    propranoloL (INDERAL) 10 MG tablet, Take 10 mg by mouth daily as needed., Disp: , Rfl:     rizatriptan (MAXALT-MLT) 10 MG disintegrating tablet, PLACE 1 T ON THE TONGUE AND ALLOW TO DISSOLVE ONE TIME MAY REPEAT ONCE IN  2 H X2. MAX DAILY DOSE 30MG/24H, Disp: , Rfl:     scopolamine (TRANSDERM-SCOP) 1.3-1.5 mg (1 mg over 3 days), UNWRAP AND APPLY 1 PATCH TO SKIN BEHIND EAR 2 HOURS PRIOR TO APPOINTMENT, Disp: , Rfl:     SUMAtriptan (IMITREX) 20 mg/actuation nasal spray, , Disp: , Rfl:     triamterene-hydrochlorothiazide 37.5-25 mg (MAXZIDE-25) 37.5-25 mg per tablet, TAKE 1 TABLET EVERY DAY, Disp: 90 tablet, Rfl: 3    ZINC PICOLINATE ORAL, Take by mouth. Take 1 tab each morning., Disp: , Rfl:     albuterol (PROVENTIL HFA) 90 mcg/actuation inhaler, Inhale 2 puffs into the lungs  "every 6 (six) hours as needed for Wheezing or Shortness of Breath. Rescue, Disp: 6.7 g, Rfl: 2    estradioL (ESTRACE) 0.01 % (0.1 mg/gram) vaginal cream, Place 2 g vaginally every evening for 14 days, THEN 1 g every evening for 14 days, THEN 1 g twice a week for 14 days., Disp: 42.5 g, Rfl: 1    promethazine-dextromethorphan (PROMETHAZINE-DM) 6.25-15 mg/5 mL Syrp, Take 5 mLs by mouth nightly as needed (cough)., Disp: 118 mL, Rfl: 0  No current facility-administered medications for this visit.    Facility-Administered Medications Ordered in Other Visits:     lactated ringers infusion, , Intravenous, Continuous, Jeff Velasquez MD, Last Rate: 25 mL/hr at 08/15/23 0800, New Bag at 08/15/23 0800    LIDOcaine (PF) 10 mg/ml (1%) injection 10 mg, 1 mL, Intradermal, Once, Jeff Velasquez MD    Review of Systems   Constitutional:  Negative for chills, fever and weight loss.   HENT:  Positive for postnasal drip, rhinorrhea and sore throat. Negative for ear pain.    Respiratory:  Positive for cough, shortness of breath and wheezing. Negative for hemoptysis.    Cardiovascular:  Positive for chest pain.   Gastrointestinal:  Negative for heartburn.   Musculoskeletal:  Negative for myalgias.   Skin:  Negative for rash.   Neurological:  Negative for headaches.       Objective:      /70 (BP Location: Right arm, Patient Position: Sitting)   Pulse (!) 55   Temp 97.9 °F (36.6 °C) (Oral)   Resp 18   Ht 5' 7" (1.702 m)   Wt 93.6 kg (206 lb 5.6 oz)   SpO2 96%   BMI 32.32 kg/m²   Physical Exam  Vitals reviewed.   Constitutional:       General: She is not in acute distress.     Appearance: Normal appearance. She is obese. She is not ill-appearing, toxic-appearing or diaphoretic.   HENT:      Head: Normocephalic.      Right Ear: External ear normal.      Left Ear: External ear normal.      Nose: Nose normal. No congestion or rhinorrhea.      Mouth/Throat:      Mouth: Mucous membranes are moist.      Pharynx: Oropharynx is clear. " Posterior oropharyngeal erythema present.   Eyes:      General: No scleral icterus.        Right eye: No discharge.         Left eye: No discharge.      Extraocular Movements: Extraocular movements intact.      Conjunctiva/sclera: Conjunctivae normal.   Cardiovascular:      Rate and Rhythm: Regular rhythm. Bradycardia present.      Pulses: Normal pulses.      Heart sounds: Normal heart sounds. No murmur heard.     No friction rub. No gallop.   Pulmonary:      Effort: Pulmonary effort is normal. No respiratory distress.      Breath sounds: Wheezing and rhonchi (central) present. No rales.   Chest:      Chest wall: No tenderness.   Musculoskeletal:         General: No swelling, tenderness or deformity. Normal range of motion.      Cervical back: Normal range of motion.      Right lower leg: No edema.      Left lower leg: No edema.   Skin:     General: Skin is warm and dry.      Capillary Refill: Capillary refill takes less than 2 seconds.      Coloration: Skin is not jaundiced.      Findings: No bruising, erythema, lesion or rash.   Neurological:      Mental Status: She is alert and oriented to person, place, and time.      Gait: Gait normal.   Psychiatric:         Mood and Affect: Mood normal.         Behavior: Behavior normal.         Thought Content: Thought content normal.         Judgment: Judgment normal.         Assessment:       1. Wellness examination    2. Aortic atherosclerosis    3. Severe episode of recurrent major depressive disorder, without psychotic features    4. Essential hypertension    5. Hypokalemia    6. Persistent cough for 3 weeks or longer    7. Wheezing    8. Hypercholesteremia    9. Abnormal TSH    10. Subclinical hyperthyroidism        Plan:       Wellness examination  -     TSH; Future; Expected date: 11/25/2024  -     Comprehensive Metabolic Panel; Future; Expected date: 11/25/2024  -     Lipid Panel; Future; Expected date: 11/25/2024  -     CBC Auto Differential; Future; Expected date:  11/25/2024  -     IRON AND TIBC; Future; Expected date: 11/25/2024  -     FERRITIN; Future; Expected date: 11/25/2024    Aortic atherosclerosis        -    Stable. No concerning features at this time. Check lipid. Will continue to monitor. May be a good idea to discuss statin therapy at follow up or pending lipid results today.     The 10-year ASCVD risk score (Cat DK, et al., 2019) is: 7.7%    Values used to calculate the score:      Age: 64 years      Sex: Female      Is Non- : No      Diabetic: No      Tobacco smoker: No      Systolic Blood Pressure: 120 mmHg      Is BP treated: Yes      HDL Cholesterol: 58 mg/dL      Total Cholesterol: 231 mg/dL       Severe episode of recurrent major depressive disorder, without psychotic features        -    Stable. Continue prozac. Will continue to monitor.     Essential hypertension  -     Comprehensive Metabolic Panel; Future; Expected date: 11/25/2024  -     CBC Auto Differential; Future; Expected date: 11/25/2024        -    Stable. Continue maxzide. Will continue to monitor.     Hypokalemia  -     Comprehensive Metabolic Panel; Future; Expected date: 11/25/2024    Persistent cough for 3 weeks or longer  -     promethazine-dextromethorphan (PROMETHAZINE-DM) 6.25-15 mg/5 mL Syrp; Take 5 mLs by mouth nightly as needed (cough).  Dispense: 118 mL; Refill: 0  -     X-Ray Chest PA And Lateral; Future; Expected date: 11/25/2024    Wheezing  -     albuterol (PROVENTIL HFA) 90 mcg/actuation inhaler; Inhale 2 puffs into the lungs every 6 (six) hours as needed for Wheezing or Shortness of Breath. Rescue  Dispense: 6.7 g; Refill: 2    Hypercholesteremia  -     Lipid Panel; Future; Expected date: 11/25/2024    Abnormal TSH  -     TSH; Future; Expected date: 11/25/2024    Subclinical hyperthyroidism  -     TSH; Future; Expected date: 11/25/2024                 Jabari Conley PA-C  Family Medicine Physician Assistant       Future Appointments       Date  Specialty Appt Notes    11/25/2024 Radiology Persistent cough for 3 weeks or longer    11/25/2024 Lab Wellness examination               I spent a total of 20 minutes on the day of the visit.This includes face to face time and non-face to face time preparing to see the patient (eg, review of tests), obtaining and/or reviewing separately obtained history, documenting clinical information in the electronic or other health record, independently interpreting results and communicating results to the patient/family/caregiver, or care coordinator.      We have addressed [4] Moderate: 1 or more chronic illnesses with exacerbation, progression, or side effects of treatment / 2 or more stable chronic illnesses / 1 undiagnosed new problem with uncertain prognosis / 1 acute illness with systemic symptoms / 1 acute complicated injury  The complexity of the data reviewed and analyzed for this visit was [3] Limited (Reviewed prior external note, ordered unique testing or reviewed the results of each unique test)   The risk of complications and/or morbidity or mortality are [4] Moderate risk (I.e. prescription drug management / decision regarding minor surgery with identified pt or procedure risk factors / decision regarding elective major surgery without identified pt or procedure risk factors / diagnosis or treatment significantly limited by social determinants of health)   The level of Medical Decision Making for this visit is [4] Moderate

## 2024-12-16 ENCOUNTER — E-VISIT (OUTPATIENT)
Dept: FAMILY MEDICINE | Facility: CLINIC | Age: 64
End: 2024-12-16
Payer: MEDICARE

## 2024-12-16 DIAGNOSIS — R05.2 SUBACUTE COUGH: Primary | ICD-10-CM

## 2024-12-16 DIAGNOSIS — J01.00 SUBACUTE MAXILLARY SINUSITIS: ICD-10-CM

## 2024-12-16 RX ORDER — AMOXICILLIN AND CLAVULANATE POTASSIUM 875; 125 MG/1; MG/1
1 TABLET, FILM COATED ORAL 2 TIMES DAILY
Qty: 14 TABLET | Refills: 0 | Status: SHIPPED | OUTPATIENT
Start: 2024-12-16 | End: 2024-12-23

## 2024-12-16 RX ORDER — PROMETHAZINE HYDROCHLORIDE AND DEXTROMETHORPHAN HYDROBROMIDE 6.25; 15 MG/5ML; MG/5ML
5 SYRUP ORAL EVERY 6 HOURS PRN
Qty: 118 ML | Refills: 1 | Status: SHIPPED | OUTPATIENT
Start: 2024-12-16 | End: 2024-12-26

## 2024-12-16 NOTE — PROGRESS NOTES
Patient ID: Leticia Zelaya is a 64 y.o. female.    Chief Complaint: General Illness (Entered automatically based on patient selection in [facility]          274}  The patient initiated a request through [facility] on 12/16/2024 for evaluation and management with a chief complaint of General Illness (Entered automatically based on patient selection in [facility]).)     I evaluated the questionnaire submission on 12/16/2024 .    Total Time (in minutes): 11     [facility] Peq Evisit Supergroup-Cough And Cold    12/16/2024  9:52 AM CST - Filed by Patient   What do you need help with? Cough, Cold, Sore Throat   Do you agree to participate in an E-Visit? Yes   If you have any of the following symptoms, go to your local emergency room or call 911: I acknowledge   What is the main issue you would like addressed today? Sore throat   Do you think you might have COVID or the Flu? No   Have you tested positive for COVID or Flu? No   What symptoms do you currently have?  Cough;  Runny nose;  Sore throat   Describe your cough: Productive (containing mucus)   Describe the mucus: Green;  Thick   Have you had any of the following? None of the above   Have you ever smoked? I have never smoked   Have you had a fever? No   When did your symptoms first appear? 12/14/2024   In the last two weeks, have you been in close contact with someone who has COVID-19 or the Flu? No   List what you have done or taken to help your symptoms. BC Powder for throat pain and dextromethophan for cough   How severe are your symptoms? Moderate   Have your symptoms gotten better or worse since they started?  Worse   Do you have transportation to get testing if it is needed and ordered for you at an [facility] location? Yes   Provide any additional information you feel is important. My 4-year-old grandson stayed with us over the weekend and he tested positive for strep throat Friday.   Please attach any relevant images or files    Are you able to take your vital signs? No           Active Problem List with Overview Notes    Diagnosis Date Noted    Aortic atherosclerosis 11/25/2024    Severe episode of recurrent major depressive disorder, without psychotic features 12/08/2023    Rib pain 12/18/2020    Essential hypertension 05/02/2016    Abnormal echocardiogram 05/02/2016      Recent Labs Obtained:  Lab Results   Component Value Date    WBC 7.76 11/25/2024    HGB 13.2 11/25/2024    HCT 42.6 11/25/2024    MCV 92 11/25/2024     11/25/2024     11/25/2024    K 3.5 11/25/2024    GLU 94 11/25/2024    CREATININE 0.8 11/25/2024    EGFRNORACEVR >60.0 11/25/2024    HGBA1C 5.5 12/08/2023    TSH 0.556 11/25/2024      Review of patient's allergies indicates:   Allergen Reactions    Fish containing products Hives, Itching, Swelling and Rash     Pt. States can eat shellfish.       Encounter Diagnoses   Name Primary?    Subacute cough Yes    Subacute maxillary sinusitis         No orders of the defined types were placed in this encounter.     Medications Ordered This Encounter   Medications    amoxicillin-clavulanate 875-125mg (AUGMENTIN) 875-125 mg per tablet     Sig: Take 1 tablet by mouth 2 (two) times daily. for 7 days     Dispense:  14 tablet     Refill:  0    promethazine-dextromethorphan (PROMETHAZINE-DM) 6.25-15 mg/5 mL Syrp     Sig: Take 5 mLs by mouth every 6 (six) hours as needed (cough).     Dispense:  118 mL     Refill:  1        E-Visit Time Tracking:    Day 1 Time (in minutes): 11    Total Time (in minutes): 11      274}

## 2025-01-15 DIAGNOSIS — R07.81 RIB PAIN: ICD-10-CM

## 2025-01-16 ENCOUNTER — HOSPITAL ENCOUNTER (OUTPATIENT)
Dept: RADIOLOGY | Facility: CLINIC | Age: 65
Discharge: HOME | End: 2025-01-16
Attending: STUDENT IN AN ORGANIZED HEALTH CARE EDUCATION/TRAINING PROGRAM
Payer: MEDICARE

## 2025-01-16 ENCOUNTER — E-VISIT (OUTPATIENT)
Dept: URGENT CARE | Facility: CLINIC | Age: 65
End: 2025-01-16
Payer: MEDICARE

## 2025-01-16 ENCOUNTER — PATIENT MESSAGE (OUTPATIENT)
Dept: FAMILY MEDICINE | Facility: CLINIC | Age: 65
End: 2025-01-16
Payer: MEDICARE

## 2025-01-16 DIAGNOSIS — M79.672 LEFT FOOT PAIN: ICD-10-CM

## 2025-01-16 DIAGNOSIS — R07.81 RIB PAIN ON LEFT SIDE: ICD-10-CM

## 2025-01-16 DIAGNOSIS — M79.672 LEFT FOOT PAIN: Primary | ICD-10-CM

## 2025-01-16 PROCEDURE — 73620 X-RAY EXAM OF FOOT: CPT | Mod: TC,HCNC,FY,PO,LT

## 2025-01-16 PROCEDURE — 99422 OL DIG E/M SVC 11-20 MIN: CPT | Mod: ,,, | Performed by: STUDENT IN AN ORGANIZED HEALTH CARE EDUCATION/TRAINING PROGRAM

## 2025-01-16 PROCEDURE — 71100 X-RAY EXAM RIBS UNI 2 VIEWS: CPT | Mod: 26,HCNC,RT, | Performed by: STUDENT IN AN ORGANIZED HEALTH CARE EDUCATION/TRAINING PROGRAM

## 2025-01-16 PROCEDURE — 73620 X-RAY EXAM OF FOOT: CPT | Mod: 26,HCNC,LT, | Performed by: STUDENT IN AN ORGANIZED HEALTH CARE EDUCATION/TRAINING PROGRAM

## 2025-01-16 PROCEDURE — 71100 X-RAY EXAM RIBS UNI 2 VIEWS: CPT | Mod: TC,HCNC,FY,PO,LT

## 2025-01-16 PROCEDURE — 71100 X-RAY EXAM RIBS UNI 2 VIEWS: CPT | Mod: TC,HCNC,FY,PO,RT

## 2025-01-16 RX ORDER — MELOXICAM 15 MG/1
15 TABLET ORAL DAILY PRN
Qty: 30 TABLET | Refills: 1 | Status: SHIPPED | OUTPATIENT
Start: 2025-01-16 | End: 2025-02-15

## 2025-01-16 NOTE — TELEPHONE ENCOUNTER
Care Due:                  Date            Visit Type   Department     Provider  --------------------------------------------------------------------------------    Last Visit: None Found      None         None Found  Next Visit: None Scheduled  None         None Found                                                            Last  Test          Frequency    Reason                     Performed    Due Date  --------------------------------------------------------------------------------    Office Visit  15 months..  omeprazole, potassium,     Not Found    Overdue                             triamterene-hydrochloroth                             christiano...................    [employer] Embedded Care Due Messages. Reference number: [id number].   1/15/2025 10:30:33 PM CST

## 2025-01-16 NOTE — PROGRESS NOTES
Patient ID: Leticia Zelaya is a 65 y.o. female.    Chief Complaint: General Illness (Entered automatically based on patient selection in [employer].)          274}  The patient initiated a request through [employer] on 1/16/2025 for evaluation and management with a chief complaint of General Illness (Entered automatically based on patient selection in [employer].)     I evaluated the questionnaire submission on 01/16/2025 .    Total Time (in minutes): 12     [facility] Peq Evisit Supergroup-Muscle,Back,Joint    1/16/2025 12:41 PM CST - Filed by Patient   What do you need help with? Other Concern   Do you agree to participate in an E-Visit? Yes   If you have any of the following symptoms, please present to your local emergency room or call 911:  I acknowledge   What is the main issue you would like addressed today? I fell last night. I need orders sent in for xrays of my left foot (2 toes may be broken), chest xrays (i think I fractured a rib on the left side and nay have reinjuted previously broken ribs on the right side.   Please describe your symptoms A lot of pain in the left chest, some pain in the right chest, swollen and painful toes in left foot   Where is your problem located? Left foot, left ribs, right ribs   How severe are your symptoms? Moderate   Have you had these symptoms before? No   How long have you been having these symptoms? For a few days   Please list any medications or treatments you have used for your condition and indicate if it was effective or not.    What makes this feel better? Not moving   What makes this feel worse? Moving, walking, laughing, breathing   Are these symptoms related to a condition that you currently have? Yes   What is the condition? A fall   When were you last seen for this condition?    Please describe any probable cause for these symptoms I fell last night   Provide any additional information you feel is important. Id like to avoid the ER and get xrays to inform treatment options  and i need pain meds for nighttime as sleeping was a challenge   Please attach any relevant images or files    Are you able to take your vital signs? No          Active Problem List with Overview Notes    Diagnosis Date Noted    Aortic atherosclerosis 11/25/2024    Severe episode of recurrent major depressive disorder, without psychotic features 12/08/2023    Rib pain 12/18/2020    Essential hypertension 05/02/2016    Abnormal echocardiogram 05/02/2016      Recent Labs Obtained:  Lab Results   Component Value Date    WBC 7.76 11/25/2024    HGB 13.2 11/25/2024    HCT 42.6 11/25/2024    MCV 92 11/25/2024     11/25/2024     11/25/2024    K 3.5 11/25/2024    GLU 94 11/25/2024    CREATININE 0.8 11/25/2024    EGFRNORACEVR >60.0 11/25/2024    HGBA1C 5.5 12/08/2023    TSH 0.556 11/25/2024      Review of patient's allergies indicates:   Allergen Reactions    Fish containing products Hives, Itching, Swelling and Rash     Pt. States can eat shellfish.       Encounter Diagnoses   Name Primary?    Left foot pain Yes    Rib pain on left side         Orders Placed This Encounter   Procedures    X-Ray Foot 2 View Left     Standing Status:   Future     Standing Expiration Date:   1/16/2026     Order Specific Question:   May the Radiologist modify the order per protocol to meet the clinical needs of the patient?     Answer:   Yes     Order Specific Question:   Release to patient     Answer:   Immediate    X-Ray Ribs 2 View Left     Standing Status:   Future     Standing Expiration Date:   1/16/2026     Order Specific Question:   May the Radiologist modify the order per protocol to meet the clinical needs of the patient?     Answer:   Yes     Order Specific Question:   Release to patient     Answer:   Immediate    X-Ray Ribs 2 View Right     Standing Status:   Future     Standing Expiration Date:   1/16/2026     Order Specific Question:   May the Radiologist modify the order per protocol to meet the clinical needs of the  patient?     Answer:   Yes     Order Specific Question:   Release to patient     Answer:   Immediate      Medications Ordered This Encounter   Medications    meloxicam (MOBIC) 15 MG tablet     Sig: Take 1 tablet (15 mg total) by mouth daily as needed for Pain.     Dispense:  30 tablet     Refill:  1        E-Visit Time Tracking:    Day 1 Time (in minutes): 12    Total Time (in minutes): 12      274}

## 2025-01-17 RX ORDER — CYCLOBENZAPRINE HCL 5 MG
TABLET ORAL
Qty: 30 TABLET | Refills: 1 | Status: SHIPPED | OUTPATIENT
Start: 2025-01-17

## 2025-01-22 DIAGNOSIS — R03.0 ELEVATED BLOOD PRESSURE READING: ICD-10-CM

## 2025-01-22 DIAGNOSIS — E87.6 HYPOKALEMIA: ICD-10-CM

## 2025-01-22 NOTE — TELEPHONE ENCOUNTER
No care due was identified.  [facility] Embedded Care Due Messages. Reference number: [id number].   1/22/2025 1:52:21 AM CST

## 2025-01-23 RX ORDER — TRIAMTERENE/HYDROCHLOROTHIAZID 37.5-25 MG
1 TABLET ORAL
Qty: 90 TABLET | Refills: 4 | Status: SHIPPED | OUTPATIENT
Start: 2025-01-23

## 2025-01-23 RX ORDER — POTASSIUM CHLORIDE 20 MEQ/1
20 TABLET, EXTENDED RELEASE ORAL 2 TIMES DAILY
Qty: 180 TABLET | Refills: 4 | Status: SHIPPED | OUTPATIENT
Start: 2025-01-23

## 2025-01-23 NOTE — TELEPHONE ENCOUNTER
Refill Routing Note   Medication(s) are not appropriate for processing by [facility] for the following reason(s):        Patient not seen by provider within 15 months    [facility] action(s):  Route             Appointments  past 12m or future 3m with PCP    Date Provider   Last Visit   12/29/2022 Josh Aj MD   Next Visit   Visit date not found Josh Aj MD   ED visits in past 90 days: 0        Note composed:9:21 AM 01/23/2025

## 2025-02-12 DIAGNOSIS — K29.70 GASTRITIS, PRESENCE OF BLEEDING UNSPECIFIED, UNSPECIFIED CHRONICITY, UNSPECIFIED GASTRITIS TYPE: ICD-10-CM

## 2025-02-12 RX ORDER — OMEPRAZOLE 40 MG/1
40 CAPSULE, DELAYED RELEASE ORAL
Qty: 90 CAPSULE | Refills: 3 | Status: SHIPPED | OUTPATIENT
Start: 2025-02-12

## 2025-02-12 NOTE — TELEPHONE ENCOUNTER
No care due was identified.  [facility] Embedded Care Due Messages. Reference number: [id number].   2/12/2025 1:25:38 AM CST

## 2025-02-24 DIAGNOSIS — Z00.00 ENCOUNTER FOR MEDICARE ANNUAL WELLNESS EXAM: ICD-10-CM

## 2025-03-25 ENCOUNTER — E-VISIT (OUTPATIENT)
Dept: PRIMARY CARE CLINIC | Facility: CLINIC | Age: 65
End: 2025-03-25
Payer: MEDICARE

## 2025-03-25 DIAGNOSIS — B96.89 BACTERIAL RESPIRATORY INFECTION: Primary | ICD-10-CM

## 2025-03-25 DIAGNOSIS — J98.8 BACTERIAL RESPIRATORY INFECTION: Primary | ICD-10-CM

## 2025-03-25 RX ORDER — ALBUTEROL SULFATE 90 UG/1
2 INHALANT RESPIRATORY (INHALATION) EVERY 6 HOURS PRN
Qty: 18 G | Refills: 0 | Status: SHIPPED | OUTPATIENT
Start: 2025-03-25

## 2025-03-25 RX ORDER — AZITHROMYCIN 250 MG/1
TABLET, FILM COATED ORAL
Qty: 6 TABLET | Refills: 0 | Status: SHIPPED | OUTPATIENT
Start: 2025-03-25

## 2025-03-25 RX ORDER — METHYLPREDNISOLONE 4 MG/1
TABLET ORAL
Qty: 21 TABLET | Refills: 0 | Status: SHIPPED | OUTPATIENT
Start: 2025-03-25

## 2025-03-25 NOTE — PROGRESS NOTES
Patient ID: Leticia Zelaya is a 65 y.o. female.    Chief Complaint: General Illness (Entered automatically based on patient selection in [facility].)    The patient initiated a request through [facility] on 3/25/2025 for evaluation and management with a chief complaint of General Illness (Entered automatically based on patient selection in [facility].)     I evaluated the questionnaire submission on 03/25/2025.    [facility] Evisit Supergroup-Cough And Cold    3/25/2025  7:50 AM CDT - Filed by Patient   What do you need help with? Cough, Cold, Sore Throat   Do you agree to participate in an E-Visit? Yes   If you have any of the following symptoms, go to your local emergency room or call 911: I acknowledge   What is the main issue you would like addressed today? Bronchitis for several weeks   Do you think you might have COVID-19 or the Flu? No   What symptoms do you currently have?  Cough;  Fatigue;  Headache;  Runny nose;  Sore throat   Describe your cough: Contains mucus;  Comes and goes   Describe your mucus: White   Have you had any trouble with your breathing, swollowing, or vision? None   Have you ever smoked? Never smoked   Do you have a fever? No   When did your skin concern begin? 3/25/2025   In the last two weeks, have you been in close contact with someone who has COVID-19, the Flu, or strep throat? No   What have you tried to help your symptoms? Cough syrup;  Rest and sleep   On a scale of 1-10, where 10 is the worst you can imagine, how severe are your symptoms? (range: 1 - 10) 6   How have your symptoms changed since they first started? Improved   Do you have transportation to an [facility] location to get tested for COVID-19? Yes   Provide any additional information you feel is important. I took amoxicillin and the cough improved a bit. I also have promethazine DM left from a previous bout of bronchitis and that helps some. I think I need a Z-pack, a Medrol dose pack and an albuterol inhaler.   Please attach any  relevant images or files    Are you able to take your vital signs? No         Encounter Diagnosis   Name Primary?    Bacterial respiratory infection Yes        No orders of the defined types were placed in this encounter.     Medications Ordered This Encounter   Medications    albuterol (PROVENTIL/VENTOLIN HFA) 90 mcg/actuation inhaler     Sig: Inhale 2 puffs into the lungs every 6 (six) hours as needed for Wheezing or Shortness of Breath.     Dispense:  18 g     Refill:  0    azithromycin (Z-BRITTANY) 250 MG tablet     Sig: Take 2 tablets on day 1, then 1 tablet daily on days 2 - 5.     Dispense:  6 tablet     Refill:  0    methylPREDNISolone (MEDROL DOSEPACK) 4 mg tablet     Sig: follow package directions     Dispense:  21 tablet     Refill:  0        Follow up if symptoms worsen or fail to improve.      E-Visit Time Tracking:    Day 1 Time (in minutes): 5    Total Time (in minutes): 5

## 2025-07-23 DIAGNOSIS — R07.81 RIB PAIN: ICD-10-CM

## 2025-07-24 RX ORDER — CYCLOBENZAPRINE HCL 5 MG
TABLET ORAL
Qty: 30 TABLET | Refills: 1 | Status: SHIPPED | OUTPATIENT
Start: 2025-07-24

## 2025-07-24 NOTE — TELEPHONE ENCOUNTER
Care Due:                  Date            Visit Type   Department     Provider  --------------------------------------------------------------------------------    Last Visit: None Found      None         None Found  Next Visit: None Scheduled  None         None Found                                                            Last  Test          Frequency    Reason                     Performed    Due Date  --------------------------------------------------------------------------------    Office Visit  15 months..  omeprazole, potassium,     Not Found    Overdue                             triamterene-hydrochloroth                             christiano...................    [employer] Embedded Care Due Messages. Reference number: [id number].   7/23/2025 7:05:34 PM CDT

## (undated) DEVICE — SYS LABEL CORRECT MED

## (undated) DEVICE — SYR GLASS 5CC LUER LOK

## (undated) DEVICE — TUBING MINIBORE EXTENSION

## (undated) DEVICE — NDL HYPO SAFETY 25GX1.5IN

## (undated) DEVICE — GLOVE SURG ULTRA TOUCH 7.5

## (undated) DEVICE — NDL BLUNT W/O FILTER 18GX1.5IN

## (undated) DEVICE — NDL TUOHY EPIDURAL 20G X 3.5

## (undated) DEVICE — SYR DISP LL 5CC

## (undated) DEVICE — CHLORAPREP 10.5 ML APPLICATOR